# Patient Record
Sex: MALE | Race: WHITE | NOT HISPANIC OR LATINO | Employment: FULL TIME | RURAL
[De-identification: names, ages, dates, MRNs, and addresses within clinical notes are randomized per-mention and may not be internally consistent; named-entity substitution may affect disease eponyms.]

---

## 2018-09-29 ENCOUNTER — CONVERSION ENCOUNTER (OUTPATIENT)
Dept: FAMILY MEDICINE CLINIC | Facility: CLINIC | Age: 58
End: 2018-09-29

## 2018-09-30 LAB
ALBUMIN SERPL-MCNC: 4.3 G/DL (ref 3.6–5.1)
ALP SERPL-CCNC: 61 U/L (ref 40–115)
ALT SERPL-CCNC: 22 U/L (ref 9–46)
AST SERPL-CCNC: 20 U/L (ref 10–35)
BASOPHILS # BLD AUTO: 78 CELLS/UL (ref 0–200)
BASOPHILS NFR BLD AUTO: 1.7 %
BILIRUB SERPL-MCNC: 0.7 MG/DL (ref 0.2–1.2)
BUN SERPL-MCNC: 17 MG/DL (ref 7–25)
BUN/CREAT SERPL: NORMAL (CALC) (ref 6–22)
CALCIUM SERPL-MCNC: 9.3 MG/DL (ref 8.6–10.3)
CHLORIDE SERPL-SCNC: 103 MMOL/L (ref 98–110)
CHOLEST SERPL-MCNC: 159 MG/DL
CHOLEST/HDLC SERPL: 3.7 (CALC)
CONV CO2: 28 MMOL/L (ref 20–32)
CONV TOTAL PROTEIN: 7.1 G/DL (ref 6.1–8.1)
CREAT UR-MCNC: 0.95 MG/DL (ref 0.7–1.33)
EOSINOPHIL # BLD AUTO: 331 CELLS/UL (ref 15–500)
EOSINOPHIL # BLD AUTO: 7.2 %
ERYTHROCYTE [DISTWIDTH] IN BLOOD BY AUTOMATED COUNT: 12.8 % (ref 11–15)
GLOBULIN UR ELPH-MCNC: 2.8 MG/DL (ref 1.9–3.7)
GLUCOSE UR QL: 96 MG/DL (ref 65–99)
HCT VFR BLD AUTO: 46.6 % (ref 38.5–50)
HDLC SERPL-MCNC: 43 MG/DL
HGB BLD-MCNC: 15.8 G/DL (ref 13.2–17.1)
INSULIN SERPL-ACNC: 1.5 (CALC) (ref 1–2.5)
LDLC SERPL CALC-MCNC: 97 MG/DL
LYMPHOCYTES # BLD AUTO: 920 CELLS/UL (ref 850–3900)
LYMPHOCYTES NFR BLD AUTO: 20 %
MCH RBC QN AUTO: 29.4 PG (ref 27–33)
MCHC RBC AUTO-ENTMCNC: 33.9 G/DL (ref 32–36)
MCV RBC AUTO: 86.6 FL (ref 80–100)
MONOCYTES # BLD AUTO: 400 CELLS/UL (ref 200–950)
MONOCYTES NFR BLD AUTO: 8.7 %
NEUTROPHILS # BLD AUTO: 2870 CELLS/UL (ref 1500–7800)
NEUTROPHILS NFR BLD AUTO: 62.4 %
NONHDLC SERPL-MCNC: 116 MG/DL
PLATELET # BLD AUTO: 244 10*3/UL (ref 140–400)
PMV BLD AUTO: 9.8 FL (ref 7.5–12.5)
POTASSIUM SERPL-SCNC: 4.4 MMOL/L (ref 3.5–5.3)
PSA SERPL-MCNC: 2.7 NG/ML
RBC # BLD AUTO: 5.38 MILLION/UL (ref 4.2–5.8)
SODIUM SERPL-SCNC: 138 MMOL/L (ref 135–146)
TRIGL SERPL-MCNC: 93 MG/DL
TSH SERPL-ACNC: 1.1 MIU/L (ref 0.4–4.5)
WBC # BLD AUTO: 4.6 10*3/UL (ref 3.8–10.8)

## 2019-09-10 RX ORDER — SIMVASTATIN 20 MG
TABLET ORAL
Qty: 90 TABLET | Refills: 3 | OUTPATIENT
Start: 2019-09-10

## 2019-09-11 DIAGNOSIS — I10 BENIGN HYPERTENSION: Primary | ICD-10-CM

## 2019-09-11 DIAGNOSIS — E78.2 MIXED HYPERLIPIDEMIA: ICD-10-CM

## 2019-09-11 NOTE — TELEPHONE ENCOUNTER
Pt is requesting a refill of Simvastatin and Lisinopril to last until appt on 9/16/19 sent to [facility] [place]

## 2019-09-12 RX ORDER — ATENOLOL 50 MG/1
50 TABLET ORAL DAILY
Qty: 30 TABLET | Refills: 0 | Status: SHIPPED | OUTPATIENT
Start: 2019-09-12 | End: 2019-09-16 | Stop reason: SDUPTHER

## 2019-09-12 RX ORDER — SIMVASTATIN 20 MG
20 TABLET ORAL DAILY
Qty: 30 TABLET | Refills: 0 | Status: SHIPPED | OUTPATIENT
Start: 2019-09-12 | End: 2019-09-16 | Stop reason: SDUPTHER

## 2019-09-13 PROBLEM — R63.6 UNDERWEIGHT: Status: ACTIVE | Noted: 2019-09-13

## 2019-09-16 ENCOUNTER — OFFICE VISIT (OUTPATIENT)
Dept: FAMILY MEDICINE CLINIC | Facility: CLINIC | Age: 59
End: 2019-09-16

## 2019-09-16 VITALS
DIASTOLIC BLOOD PRESSURE: 82 MMHG | HEART RATE: 83 BPM | HEIGHT: 74 IN | OXYGEN SATURATION: 99 % | SYSTOLIC BLOOD PRESSURE: 138 MMHG | RESPIRATION RATE: 18 BRPM | BODY MASS INDEX: 22.07 KG/M2 | TEMPERATURE: 98 F | WEIGHT: 172 LBS

## 2019-09-16 DIAGNOSIS — Z00.00 PHYSICAL EXAM: Primary | ICD-10-CM

## 2019-09-16 DIAGNOSIS — Z12.11 COLON CANCER SCREENING: ICD-10-CM

## 2019-09-16 DIAGNOSIS — I10 BENIGN HYPERTENSION: ICD-10-CM

## 2019-09-16 DIAGNOSIS — E78.2 MIXED HYPERLIPIDEMIA: ICD-10-CM

## 2019-09-16 LAB
BILIRUB BLD-MCNC: NEGATIVE MG/DL
CLARITY, POC: CLEAR
COLOR UR: YELLOW
GLUCOSE UR STRIP-MCNC: NEGATIVE MG/DL
KETONES UR QL: NEGATIVE
LEUKOCYTE EST, POC: NEGATIVE
NITRITE UR-MCNC: NEGATIVE MG/ML
PH UR: 5 [PH] (ref 5–8)
PROT UR STRIP-MCNC: NEGATIVE MG/DL
RBC # UR STRIP: NEGATIVE /UL
SP GR UR: 1.01 (ref 1–1.03)
UROBILINOGEN UR QL: NORMAL

## 2019-09-16 PROCEDURE — 81003 URINALYSIS AUTO W/O SCOPE: CPT | Performed by: FAMILY MEDICINE

## 2019-09-16 PROCEDURE — 99396 PREV VISIT EST AGE 40-64: CPT | Performed by: FAMILY MEDICINE

## 2019-09-16 RX ORDER — ATENOLOL 50 MG/1
50 TABLET ORAL DAILY
Qty: 30 TABLET | Refills: 12 | Status: SHIPPED | OUTPATIENT
Start: 2019-09-16 | End: 2020-09-22 | Stop reason: SDUPTHER

## 2019-09-16 RX ORDER — NAPROXEN SODIUM 220 MG
220 TABLET ORAL 2 TIMES DAILY PRN
COMMUNITY
End: 2022-10-06

## 2019-09-16 RX ORDER — SIMVASTATIN 20 MG
20 TABLET ORAL DAILY
Qty: 30 TABLET | Refills: 12 | Status: SHIPPED | OUTPATIENT
Start: 2019-09-16 | End: 2020-09-22 | Stop reason: SDUPTHER

## 2019-09-16 NOTE — PROGRESS NOTES
Subjective   Beto Jo is a 58 y.o. male.     The patient is here: for coordination of medical care and annual wellness examination.  Patient's last comprehensive physical was on 9/25/18.  Patient's previous physician was Champ Choudhary MD.  Patient has: moderate activity with work/home activities, exercises 2 - 3 times per week, good appetite, feels well with no complaints, good energy level and is sleeping well  Patient's last tetanus shot was unknown. Patient's last colonoscopy was: Declined, has colaguard kit at home.           The following portions of the patient's history were reviewed and updated as appropriate: allergies, current medications, past family history, past medical history, past social history, past surgical history and problem list.    Patient Active Problem List   Diagnosis   • Benign hypertension   • Mixed hyperlipidemia   • Underweight   • Colon cancer screening       Current Outpatient Medications on File Prior to Visit   Medication Sig Dispense Refill   • ADULT ASPIRIN LOW STRENGTH PO Take 1 tablet by mouth Daily.     • hydrocortisone (ANUSOL-HC) 2.5 % rectal cream Insert 1 application into the rectum.     • MULTIPLE VITAMIN PO Take  by mouth Daily.     • naproxen sodium (ALEVE) 220 MG tablet Take 220 mg by mouth 2 (Two) Times a Day As Needed.     • [DISCONTINUED] atenolol (TENORMIN) 50 MG tablet Take 1 tablet by mouth Daily. 30 tablet 0   • [DISCONTINUED] simvastatin (ZOCOR) 20 MG tablet Take 1 tablet by mouth Daily. 30 tablet 0     No current facility-administered medications on file prior to visit.        No Known Allergies    Review of Systems   Constitutional: Negative for activity change, appetite change, fatigue and fever.   HENT: Negative for ear pain, swollen glands and voice change.    Eyes: Negative for visual disturbance.   Respiratory: Negative for shortness of breath and wheezing.    Cardiovascular: Negative for chest pain and leg swelling.   Gastrointestinal: Negative for  abdominal pain, blood in stool, constipation, diarrhea, nausea and vomiting.   Endocrine: Negative for polydipsia and polyuria.   Genitourinary: Negative for dysuria, frequency and hematuria.   Musculoskeletal: Negative for joint swelling, neck pain and neck stiffness.   Skin: Negative for rash and bruise.   Neurological: Negative for weakness, numbness and headache.   Psychiatric/Behavioral: Negative for suicidal ideas and depressed mood.       Objective   Physical Exam   Constitutional: He is oriented to person, place, and time. He appears well-developed and well-nourished.   Eyes: Conjunctivae and EOM are normal. Pupils are equal, round, and reactive to light.   Neck: Normal range of motion. Neck supple.   Cardiovascular: Normal rate, regular rhythm and normal heart sounds.   Pulmonary/Chest: Effort normal and breath sounds normal.   Abdominal: Soft. Bowel sounds are normal.   Musculoskeletal: Normal range of motion.   Neurological: He is alert and oriented to person, place, and time.   Skin: Skin is warm and dry.   Psychiatric: He has a normal mood and affect. His behavior is normal. Judgment and thought content normal.         Assessment/Plan .  Problem List Items Addressed This Visit     Benign hypertension    Overview     continue current management         Relevant Medications    atenolol (TENORMIN) 50 MG tablet    Other Relevant Orders    POC Urinalysis Dipstick, Automated (Completed)    Lipid Panel With / Chol / HDL Ratio    Mixed hyperlipidemia    Overview     pt needs 1 month refill untill follow up         Relevant Medications    simvastatin (ZOCOR) 20 MG tablet    Other Relevant Orders    Comprehensive Metabolic Panel    CBC Auto Differential    TSH    Colon cancer screening    Current Assessment & Plan     recommend cologuard or colonoscopy.           Other Visit Diagnoses     Physical exam    -  Primary    Relevant Orders    POC Urinalysis Dipstick, Automated (Completed)    Comprehensive Metabolic  Panel    CBC Auto Differential    Lipid Panel With / Chol / HDL Ratio    PSA Screen    TSH        .Discussed anticipatory guidance, diet, exercise, and weight loss.  Discussed safety and routine screening examinations.  Discussed self-examinations.  Medication and medication adverse effects discussed.  Drug education given and explained to patient. Patient verbalized understanding.  Follow-up for routine health maintenance as directed

## 2019-09-22 LAB
ALBUMIN SERPL-MCNC: 4.2 G/DL (ref 3.5–5.5)
ALBUMIN/GLOB SERPL: 1.6 {RATIO} (ref 1.2–2.2)
ALP SERPL-CCNC: 56 IU/L (ref 39–117)
ALT SERPL-CCNC: 18 IU/L (ref 0–44)
AST SERPL-CCNC: 16 IU/L (ref 0–40)
BASOPHILS # BLD AUTO: 0.1 X10E3/UL (ref 0–0.2)
BASOPHILS NFR BLD AUTO: 2 %
BILIRUB SERPL-MCNC: 0.4 MG/DL (ref 0–1.2)
BUN SERPL-MCNC: 17 MG/DL (ref 6–24)
BUN/CREAT SERPL: 15 (ref 9–20)
CALCIUM SERPL-MCNC: 9.3 MG/DL (ref 8.7–10.2)
CHLORIDE SERPL-SCNC: 106 MMOL/L (ref 96–106)
CHOLEST SERPL-MCNC: 146 MG/DL (ref 100–199)
CHOLEST/HDLC SERPL: 3.7 RATIO (ref 0–5)
CO2 SERPL-SCNC: 23 MMOL/L (ref 20–29)
CREAT SERPL-MCNC: 1.1 MG/DL (ref 0.76–1.27)
EOSINOPHIL # BLD AUTO: 0.3 X10E3/UL (ref 0–0.4)
EOSINOPHIL NFR BLD AUTO: 8 %
ERYTHROCYTE [DISTWIDTH] IN BLOOD BY AUTOMATED COUNT: 13.5 % (ref 12.3–15.4)
GLOBULIN SER CALC-MCNC: 2.7 G/DL (ref 1.5–4.5)
GLUCOSE SERPL-MCNC: 94 MG/DL (ref 65–99)
HCT VFR BLD AUTO: 46.9 % (ref 37.5–51)
HDLC SERPL-MCNC: 39 MG/DL
HGB BLD-MCNC: 15.4 G/DL (ref 13–17.7)
IMM GRANULOCYTES # BLD AUTO: 0 X10E3/UL (ref 0–0.1)
IMM GRANULOCYTES NFR BLD AUTO: 0 %
LDLC SERPL CALC-MCNC: 88 MG/DL (ref 0–99)
LYMPHOCYTES # BLD AUTO: 1.1 X10E3/UL (ref 0.7–3.1)
LYMPHOCYTES NFR BLD AUTO: 24 %
MCH RBC QN AUTO: 29.7 PG (ref 26.6–33)
MCHC RBC AUTO-ENTMCNC: 32.8 G/DL (ref 31.5–35.7)
MCV RBC AUTO: 90 FL (ref 79–97)
MONOCYTES # BLD AUTO: 0.5 X10E3/UL (ref 0.1–0.9)
MONOCYTES NFR BLD AUTO: 12 %
NEUTROPHILS # BLD AUTO: 2.4 X10E3/UL (ref 1.4–7)
NEUTROPHILS NFR BLD AUTO: 54 %
PLATELET # BLD AUTO: 212 X10E3/UL (ref 150–450)
POTASSIUM SERPL-SCNC: 4.4 MMOL/L (ref 3.5–5.2)
PROT SERPL-MCNC: 6.9 G/DL (ref 6–8.5)
PSA SERPL-MCNC: 3.2 NG/ML (ref 0–4)
RBC # BLD AUTO: 5.19 X10E6/UL (ref 4.14–5.8)
SODIUM SERPL-SCNC: 144 MMOL/L (ref 134–144)
TRIGL SERPL-MCNC: 96 MG/DL (ref 0–149)
TSH SERPL DL<=0.005 MIU/L-ACNC: 1.42 UIU/ML (ref 0.45–4.5)
VLDLC SERPL CALC-MCNC: 19 MG/DL (ref 5–40)
WBC # BLD AUTO: 4.4 X10E3/UL (ref 3.4–10.8)

## 2019-09-23 ENCOUNTER — TELEPHONE (OUTPATIENT)
Dept: FAMILY MEDICINE CLINIC | Facility: CLINIC | Age: 59
End: 2019-09-23

## 2019-09-23 DIAGNOSIS — I10 BENIGN HYPERTENSION: Primary | ICD-10-CM

## 2019-09-23 RX ORDER — HYDROCHLOROTHIAZIDE 12.5 MG/1
12.5 TABLET ORAL DAILY
Qty: 30 TABLET | Refills: 1 | Status: SHIPPED | OUTPATIENT
Start: 2019-09-23 | End: 2019-11-01

## 2019-09-23 NOTE — TELEPHONE ENCOUNTER
----- Message from Champ Choudhary MD sent at 9/22/2019  8:38 PM EDT -----  Please patient that labs were normal.

## 2019-10-21 ENCOUNTER — HOSPITAL ENCOUNTER (EMERGENCY)
Facility: HOSPITAL | Age: 59
Discharge: HOME | End: 2019-10-21
Attending: EMERGENCY MEDICINE | Admitting: EMERGENCY MEDICINE

## 2019-10-21 ENCOUNTER — APPOINTMENT (OUTPATIENT)
Dept: CT IMAGING | Facility: HOSPITAL | Age: 59
End: 2019-10-21

## 2019-10-21 VITALS
SYSTOLIC BLOOD PRESSURE: 134 MMHG | OXYGEN SATURATION: 99 % | WEIGHT: 164 LBS | DIASTOLIC BLOOD PRESSURE: 88 MMHG | HEIGHT: 74 IN | TEMPERATURE: 97.4 F | BODY MASS INDEX: 21.05 KG/M2 | HEART RATE: 58 BPM | RESPIRATION RATE: 12 BRPM

## 2019-10-21 DIAGNOSIS — G47.9 FATIGUE DUE TO SLEEP PATTERN DISTURBANCE: ICD-10-CM

## 2019-10-21 DIAGNOSIS — I10 ESSENTIAL HYPERTENSION: ICD-10-CM

## 2019-10-21 DIAGNOSIS — R53.83 FATIGUE DUE TO SLEEP PATTERN DISTURBANCE: ICD-10-CM

## 2019-10-21 DIAGNOSIS — R20.2 PARESTHESIA OF RIGHT UPPER EXTREMITY: Primary | ICD-10-CM

## 2019-10-21 LAB
ALBUMIN SERPL-MCNC: 4.4 G/DL (ref 3.5–5.2)
ALBUMIN/GLOB SERPL: 1.5 G/DL
ALP SERPL-CCNC: 55 U/L (ref 39–117)
ALT SERPL W P-5'-P-CCNC: 16 U/L (ref 1–41)
ANION GAP SERPL CALCULATED.3IONS-SCNC: 11 MMOL/L (ref 5–15)
AST SERPL-CCNC: 20 U/L (ref 1–40)
BASOPHILS # BLD AUTO: 0.1 10*3/MM3 (ref 0–0.2)
BASOPHILS NFR BLD AUTO: 1 % (ref 0–1.5)
BILIRUB SERPL-MCNC: 0.4 MG/DL (ref 0.2–1.2)
BUN BLD-MCNC: 19 MG/DL (ref 6–20)
BUN/CREAT SERPL: 17.8 (ref 7–25)
CALCIUM SPEC-SCNC: 9.2 MG/DL (ref 8.6–10.5)
CHLORIDE SERPL-SCNC: 101 MMOL/L (ref 98–107)
CO2 SERPL-SCNC: 26 MMOL/L (ref 22–29)
CREAT BLD-MCNC: 1.07 MG/DL (ref 0.76–1.27)
DEPRECATED RDW RBC AUTO: 40.3 FL (ref 37–54)
EOSINOPHIL # BLD AUTO: 0.3 10*3/MM3 (ref 0–0.4)
EOSINOPHIL NFR BLD AUTO: 5.5 % (ref 0.3–6.2)
ERYTHROCYTE [DISTWIDTH] IN BLOOD BY AUTOMATED COUNT: 13.1 % (ref 12.3–15.4)
GFR SERPL CREATININE-BSD FRML MDRD: 71 ML/MIN/1.73
GLOBULIN UR ELPH-MCNC: 3 GM/DL
GLUCOSE BLD-MCNC: 105 MG/DL (ref 65–99)
HCT VFR BLD AUTO: 47.6 % (ref 37.5–51)
HGB BLD-MCNC: 16.2 G/DL (ref 13–17.7)
HOLD SPECIMEN: NORMAL
LYMPHOCYTES # BLD AUTO: 1.2 10*3/MM3 (ref 0.7–3.1)
LYMPHOCYTES NFR BLD AUTO: 22.8 % (ref 19.6–45.3)
MCH RBC QN AUTO: 30 PG (ref 26.6–33)
MCHC RBC AUTO-ENTMCNC: 34.1 G/DL (ref 31.5–35.7)
MCV RBC AUTO: 88 FL (ref 79–97)
MONOCYTES # BLD AUTO: 0.6 10*3/MM3 (ref 0.1–0.9)
MONOCYTES NFR BLD AUTO: 12.3 % (ref 5–12)
NEUTROPHILS # BLD AUTO: 3.1 10*3/MM3 (ref 1.7–7)
NEUTROPHILS NFR BLD AUTO: 58.4 % (ref 42.7–76)
NRBC BLD AUTO-RTO: 0.1 /100 WBC (ref 0–0.2)
PLATELET # BLD AUTO: 201 10*3/MM3 (ref 140–450)
PMV BLD AUTO: 7.5 FL (ref 6–12)
POTASSIUM BLD-SCNC: 3.9 MMOL/L (ref 3.5–5.2)
PROT SERPL-MCNC: 7.4 G/DL (ref 6–8.5)
RBC # BLD AUTO: 5.41 10*6/MM3 (ref 4.14–5.8)
SODIUM BLD-SCNC: 138 MMOL/L (ref 136–145)
TROPONIN T SERPL-MCNC: <0.01 NG/ML (ref 0–0.03)
WBC NRBC COR # BLD: 5.2 10*3/MM3 (ref 3.4–10.8)

## 2019-10-21 PROCEDURE — 99283 EMERGENCY DEPT VISIT LOW MDM: CPT

## 2019-10-21 PROCEDURE — 80053 COMPREHEN METABOLIC PANEL: CPT | Performed by: EMERGENCY MEDICINE

## 2019-10-21 PROCEDURE — 70450 CT HEAD/BRAIN W/O DYE: CPT

## 2019-10-21 PROCEDURE — 84484 ASSAY OF TROPONIN QUANT: CPT | Performed by: EMERGENCY MEDICINE

## 2019-10-21 PROCEDURE — 93005 ELECTROCARDIOGRAM TRACING: CPT | Performed by: EMERGENCY MEDICINE

## 2019-10-21 PROCEDURE — 85025 COMPLETE CBC W/AUTO DIFF WBC: CPT | Performed by: EMERGENCY MEDICINE

## 2019-10-21 PROCEDURE — 93005 ELECTROCARDIOGRAM TRACING: CPT

## 2019-10-21 NOTE — DISCHARGE INSTRUCTIONS
Rest today.  He will get better rest at home  Take daily baby aspirin [82 mg]  Follow-up with your primary care provider.  You may need nerve conduction studies for your right ulnar nerve if symptoms persist

## 2019-10-21 NOTE — ED NOTES
Called laboratory to release metabolic panel. Laboratory stated that they cannot release the labs until the calcium is repeated.     Keri Escalera  10/21/19 0741

## 2019-10-21 NOTE — ED NOTES
Patient came down to ER from visiting with his wife due to waking up with tingling and numbness in his right hand/forarm area.  He also reports waking up feeling like he was blacked out because his memory was foggy.  He reports history of hypertension and started on new water pill recently.      Chapin Linares RN  10/21/19 0588

## 2019-10-21 NOTE — ED PROVIDER NOTES
Subjective   59-year-old male states he awoke with a funny sensation in his right arm.  He is right-hand dominant.  She has a history of hypertension he has had no history of previous seizure or peripheral nerve problems.  The patient reports she has been spending t nights this week at the hospital with his wife who is sick.  He denies current headache.  He denies neck pain or stiffness.  He reports no diaphoresis or palpitations.  He reports that the tingling was worse in the fourth and fifth digits.  He reports that he has had no focal neurologic defects or lateralizing neurologic signs            Review of Systems   Constitutional: Positive for fatigue. Negative for chills, diaphoresis, fever and unexpected weight change.   HENT: Negative for sore throat.    Eyes: Negative for visual disturbance.   Respiratory: Negative for wheezing and stridor.    Cardiovascular: Negative for palpitations and leg swelling.   Gastrointestinal: Negative for abdominal pain.   Genitourinary: Negative for enuresis and urgency.   Neurological: Positive for dizziness and light-headedness. Negative for tremors, seizures, facial asymmetry, weakness, numbness and headaches.   Hematological: Does not bruise/bleed easily.       Past Medical History:   Diagnosis Date   • Acute bacterial conjunctivitis of right eye    • Annual physical exam     Impression: Discussed anticipatory guidance, diet, exercise, and weight loss. Discussed safety, seatbelts, and routine screening examinations. Discussed self-examinations.   • Benign hypertension     Impression: continue current management   • External hemorrhoids     Impression: Findings discussed. All questions answered. Medication and medication adverse effects discussed. Follow-up in 2 weeks if not better. Follow-up sooner for worsening symptoms or for any concerns.   • History of cardiac catheterization     Impression: nonobstructive CAD 4/07 40-50% obstr LAD   • Irritant contact dermatitis due to  other agents     Impression: taking topical steroids. Improving, Continue current mgmt.   • Kidney stone    • Mixed hyperlipidemia     Impression: pt needs 1 month refill untill follow up   • Periorbital cellulitis of right eye     Impression: He was started on Augmentin 875 BID x 10 days and Maxitrol. He was encouraged to RTC if his symptoms did not improve.   • Screening for malignant neoplasm of the rectum     Impression: Recommend screening colonoscopy. Pt will check with insurance and let me know.   • Underweight        No Known Allergies    Past Surgical History:   Procedure Laterality Date   • CARDIAC CATHETERIZATION  04/2007    40% LAD otherwise normal       Family History   Problem Relation Age of Onset   • Heart disease Mother         cardiovascular, ischemic   • Diabetes Mother         mellitus   • Stroke Other         grandfather       Social History     Socioeconomic History   • Marital status:      Spouse name: Not on file   • Number of children: Not on file   • Years of education: Not on file   • Highest education level: Not on file   Tobacco Use   • Smoking status: Never Smoker   • Smokeless tobacco: Never Used   Substance and Sexual Activity   • Alcohol use: No     Frequency: Never   • Drug use: No           Objective   Physical Exam   Constitutional: He is oriented to person, place, and time.   Neurological: He is alert and oriented to person, place, and time. He displays normal reflexes. No cranial nerve deficit or sensory deficit. He exhibits normal muscle tone. Coordination normal.     Alert Theodore Coma Scale 15   HEENT: Pupils equal and reactive to light. Conjunctivae are not injected. normal tympanic membranes. Oropharynx and nares are normal.   Neck: Supple. Midline trachea. No JVD. No goiter.   Chest: Clear and equal breath sounds bilaterally regular rate and rhythm without murmur or rub.  Nontender chest wall   Abdomen: Positive bowel sounds nontender nondistended. No rebound or  peritoneal signs. No CVA tenderness.   Extremities no clubbing cyanosis or edema motor sensory exam is normal the full range of motion is intact there is some concern for ulnar neuropathy.  The patient is right-hand dominant   skin: Warm and dry, no rashes or petechia.   Lymphatic: No regional lymphadenopathy. No calf pain, swelling or Betty's sign    Procedures           ED Course          Labs Reviewed   CBC WITH AUTO DIFFERENTIAL - Abnormal; Notable for the following components:       Result Value    Monocyte % 12.3 (*)     All other components within normal limits   TROPONIN (IN-HOUSE) - Normal    Narrative:     Troponin T Reference Range:  <= 0.03 ng/mL-   Negative for AMI  >0.03 ng/mL-     Abnormal for myocardial necrosis.  Clinicians would have to utilize clinical acumen, EKG, Troponin and serial changes to determine if it is an Acute Myocardial Infarction or myocardial injury due to an underlying chronic condition.    COMPREHENSIVE METABOLIC PANEL   CBC AND DIFFERENTIAL    Narrative:     The following orders were created for panel order CBC & Differential.  Procedure                               Abnormality         Status                     ---------                               -----------         ------                     CBC Auto Differential[[id number]]        Abnormal            Final result                 Please view results for these tests on the individual orders.   EXTRA TUBES    Narrative:     The following orders were created for panel order Extra Tubes.  Procedure                               Abnormality         Status                     ---------                               -----------         ------                     Gold Top - SST[[id number]]                                   Final result                 Please view results for these tests on the individual orders.   GOLD TOP - SST     Medications - No data to display  Ct Head Without Contrast    Result Date: 10/21/2019  1. No acute  intracranial abnormality is identified. 2. Generalized brain volume loss and mild small vessel ischemic changes. Jordy Akins M.D. Neuroradiologist Electronically signed by:  Jordy Akins M.D.  10/21/2019 5:07 AM            MDM  Number of Diagnoses or Management Options     Amount and/or Complexity of Data Reviewed  Clinical lab tests: reviewed  Tests in the radiology section of CPT®: reviewed  Tests in the medicine section of CPT®: reviewed  Review and summarize past medical records: yes  Independent visualization of images, tracings, or specimens: yes    Risk of Complications, Morbidity, and/or Mortality  Presenting problems: high  Diagnostic procedures: high  Management options: high  General comments: The patient was encouraged to take a baby aspirin daily.  The patient was encouraged also to go home and rest today.  Advised to continue his medications as directed follow-up with his primary care provider.  The patient may need evaluation for peripheral ulnar neuropathy symptomatology persists.  He is agreeable with this plan of treatment    Patient Progress  Patient progress: improved      Final diagnoses:   Paresthesia of right upper extremity   Essential hypertension   Fatigue due to sleep pattern disturbance              Jose M Gonzalez MD  10/21/19 0728

## 2019-11-01 ENCOUNTER — TELEPHONE (OUTPATIENT)
Dept: FAMILY MEDICINE CLINIC | Facility: CLINIC | Age: 59
End: 2019-11-01

## 2019-11-01 DIAGNOSIS — I10 BENIGN HYPERTENSION: ICD-10-CM

## 2019-11-01 RX ORDER — HYDROCHLOROTHIAZIDE 12.5 MG/1
25 TABLET ORAL DAILY
Qty: 30 TABLET | Refills: 1 | Status: SHIPPED | OUTPATIENT
Start: 2019-11-01 | End: 2019-11-05 | Stop reason: DRUGHIGH

## 2019-11-01 NOTE — TELEPHONE ENCOUNTER
Pt states blood pressure is still elevated after beginning medication.  10/31 7am 149/97. 67 (before meds)  10/31 7:30am 139/87. 65  10/31 9:30pm 160/93. 63  11/1 7:30am 152/96. 67 (before meds)  Please advise.

## 2019-11-05 ENCOUNTER — TELEPHONE (OUTPATIENT)
Dept: FAMILY MEDICINE CLINIC | Facility: CLINIC | Age: 59
End: 2019-11-05

## 2019-11-05 DIAGNOSIS — I10 BENIGN HYPERTENSION: ICD-10-CM

## 2019-11-05 RX ORDER — HYDROCHLOROTHIAZIDE 25 MG/1
25 TABLET ORAL DAILY
Qty: 30 TABLET | Refills: 5 | Status: SHIPPED | OUTPATIENT
Start: 2019-11-05 | End: 2020-09-22 | Stop reason: SDUPTHER

## 2019-11-05 RX ORDER — LISINOPRIL 10 MG/1
10 TABLET ORAL DAILY
Qty: 30 TABLET | Refills: 5 | Status: SHIPPED | OUTPATIENT
Start: 2019-11-05 | End: 2020-03-17 | Stop reason: SDUPTHER

## 2019-11-05 NOTE — TELEPHONE ENCOUNTER
Please verify he is taking atenolol 50 mg qd and hctz 12.5 mg qd. If so, increase hctz to 25 mg qd

## 2019-11-05 NOTE — TELEPHONE ENCOUNTER
Pt is already taking HCTZ 25mg.  Also on Atenolol 50mg qd.  Notified pt add Lisinopril 10mg qd to what he is currently taking.  Rx sent to [facility].  He will f/u in 2 weeks unless med is not helping then he will come in sooner.

## 2019-11-05 NOTE — TELEPHONE ENCOUNTER
Patient called said blood pressure is not coming down and he is concerned    Friday 148/87   156/91       Saturday  148/88            Sunday    156/96      151/84  Monday    124/86       Tuesday  134/94    Patient is concerned-call to advise what he needs to do

## 2019-11-18 ENCOUNTER — OFFICE VISIT (OUTPATIENT)
Dept: FAMILY MEDICINE CLINIC | Facility: CLINIC | Age: 59
End: 2019-11-18

## 2019-11-18 VITALS
SYSTOLIC BLOOD PRESSURE: 122 MMHG | DIASTOLIC BLOOD PRESSURE: 78 MMHG | TEMPERATURE: 97.7 F | BODY MASS INDEX: 21.92 KG/M2 | RESPIRATION RATE: 18 BRPM | HEART RATE: 73 BPM | HEIGHT: 74 IN | OXYGEN SATURATION: 98 % | WEIGHT: 170.8 LBS

## 2019-11-18 DIAGNOSIS — I10 BENIGN HYPERTENSION: Primary | ICD-10-CM

## 2019-11-18 PROCEDURE — 99213 OFFICE O/P EST LOW 20 MIN: CPT | Performed by: FAMILY MEDICINE

## 2019-11-18 NOTE — PROGRESS NOTES
Subjective   Beto Jo is a 59 y.o. male.     [facility], has brought BP readings from home.       Hypertension   The current episode started more than 1 month ago. The problem has been gradually improving since onset. The problem is controlled. Pertinent negatives include no chest pain, neck pain or shortness of breath. Current antihypertension treatment includes ACE inhibitors, diuretics and beta blockers. The current treatment provides significant improvement. There are no compliance problems.         The following portions of the patient's history were reviewed and updated as appropriate: allergies, current medications, past family history, past medical history, past social history, past surgical history and problem list.    Patient Active Problem List   Diagnosis   • Benign hypertension   • Mixed hyperlipidemia   • Underweight   • Colon cancer screening       Current Outpatient Medications on File Prior to Visit   Medication Sig Dispense Refill   • ADULT ASPIRIN LOW STRENGTH PO Take 1 tablet by mouth Daily.     • atenolol (TENORMIN) 50 MG tablet Take 1 tablet by mouth Daily. 30 tablet 12   • hydroCHLOROthiazide (HYDRODIURIL) 25 MG tablet Take 1 tablet by mouth Daily. 30 tablet 5   • hydrocortisone (ANUSOL-HC) 2.5 % rectal cream Insert 1 application into the rectum.     • lisinopril (PRINIVIL,ZESTRIL) 10 MG tablet Take 1 tablet by mouth Daily. 30 tablet 5   • MULTIPLE VITAMIN PO Take  by mouth Daily.     • naproxen sodium (ALEVE) 220 MG tablet Take 220 mg by mouth 2 (Two) Times a Day As Needed.     • simvastatin (ZOCOR) 20 MG tablet Take 1 tablet by mouth Daily. 30 tablet 12     No current facility-administered medications on file prior to visit.        No Known Allergies    Review of Systems   Constitutional: Negative for activity change, appetite change, fatigue and fever.   HENT: Negative for ear pain, swollen glands and voice change.    Eyes: Negative for visual disturbance.    Respiratory: Negative for shortness of breath and wheezing.    Cardiovascular: Negative for chest pain and leg swelling.   Gastrointestinal: Negative for abdominal pain, blood in stool, constipation, diarrhea, nausea and vomiting.   Endocrine: Negative for polydipsia and polyuria.   Genitourinary: Negative for dysuria, frequency and hematuria.   Musculoskeletal: Negative for joint swelling, neck pain and neck stiffness.   Skin: Negative for rash and bruise.   Neurological: Negative for weakness, numbness and headache.   Psychiatric/Behavioral: Negative for suicidal ideas and depressed mood.       Objective   Vitals:    11/18/19 1633   BP: 122/78   Pulse: 73   Resp: 18   Temp: 97.7 °F (36.5 °C)   SpO2: 98%     Physical Exam   Constitutional: He is oriented to person, place, and time. He appears well-developed and well-nourished.   Eyes: Conjunctivae and EOM are normal. Pupils are equal, round, and reactive to light.   Neck: Normal range of motion. Neck supple.   Cardiovascular: Normal rate, regular rhythm and normal heart sounds.   Pulmonary/Chest: Effort normal and breath sounds normal.   Abdominal: Soft. Bowel sounds are normal.   Musculoskeletal: Normal range of motion.   Neurological: He is alert and oriented to person, place, and time.   Skin: Skin is warm and dry.   Psychiatric: He has a normal mood and affect. His behavior is normal. Judgment and thought content normal.         Assessment/Plan .  Problem List Items Addressed This Visit     Benign hypertension - Primary    Overview     continue current management         Current Assessment & Plan     Much better on atenolol, hctz, and lisinopril.             Pt interested in d/c HCTZ due to the urinary freq. Try weaning. Watch for recurrence of elevated BP. Follow-up in 4-6 weeks if not better.  Follow-up sooner for worsening symptoms or for any concerns.      Otherwise f/u in 6 months.

## 2020-03-17 ENCOUNTER — TELEPHONE (OUTPATIENT)
Dept: FAMILY MEDICINE CLINIC | Facility: CLINIC | Age: 60
End: 2020-03-17

## 2020-03-17 DIAGNOSIS — I10 BENIGN HYPERTENSION: Primary | ICD-10-CM

## 2020-03-17 RX ORDER — LISINOPRIL 10 MG/1
10 TABLET ORAL DAILY
Qty: 90 TABLET | Refills: 3 | Status: SHIPPED | OUTPATIENT
Start: 2020-03-17 | End: 2020-09-22 | Stop reason: SDUPTHER

## 2020-09-22 ENCOUNTER — OFFICE VISIT (OUTPATIENT)
Dept: FAMILY MEDICINE CLINIC | Facility: CLINIC | Age: 60
End: 2020-09-22

## 2020-09-22 ENCOUNTER — RESULTS ENCOUNTER (OUTPATIENT)
Dept: FAMILY MEDICINE CLINIC | Facility: CLINIC | Age: 60
End: 2020-09-22

## 2020-09-22 VITALS
TEMPERATURE: 98.2 F | SYSTOLIC BLOOD PRESSURE: 152 MMHG | WEIGHT: 169.8 LBS | BODY MASS INDEX: 21.79 KG/M2 | HEART RATE: 75 BPM | HEIGHT: 74 IN | RESPIRATION RATE: 18 BRPM | OXYGEN SATURATION: 97 % | DIASTOLIC BLOOD PRESSURE: 90 MMHG

## 2020-09-22 DIAGNOSIS — Z00.00 PHYSICAL EXAM: Primary | ICD-10-CM

## 2020-09-22 DIAGNOSIS — H61.91 SKIN LESION OF RIGHT EAR: ICD-10-CM

## 2020-09-22 DIAGNOSIS — Z12.5 ENCOUNTER FOR PROSTATE CANCER SCREENING: ICD-10-CM

## 2020-09-22 DIAGNOSIS — Z12.11 SCREEN FOR COLON CANCER: ICD-10-CM

## 2020-09-22 DIAGNOSIS — Z12.11 COLON CANCER SCREENING: ICD-10-CM

## 2020-09-22 DIAGNOSIS — E78.2 MIXED HYPERLIPIDEMIA: ICD-10-CM

## 2020-09-22 DIAGNOSIS — I10 BENIGN HYPERTENSION: ICD-10-CM

## 2020-09-22 LAB
BILIRUB BLD-MCNC: NEGATIVE MG/DL
CLARITY, POC: CLEAR
COLOR UR: YELLOW
GLUCOSE UR STRIP-MCNC: NEGATIVE MG/DL
KETONES UR QL: NEGATIVE
LEUKOCYTE EST, POC: NEGATIVE
NITRITE UR-MCNC: NEGATIVE MG/ML
PH UR: 5 [PH] (ref 5–8)
PROT UR STRIP-MCNC: NEGATIVE MG/DL
RBC # UR STRIP: ABNORMAL /UL
SP GR UR: 1.02 (ref 1–1.03)
UROBILINOGEN UR QL: NORMAL

## 2020-09-22 PROCEDURE — 99212 OFFICE O/P EST SF 10 MIN: CPT | Performed by: FAMILY MEDICINE

## 2020-09-22 PROCEDURE — 99396 PREV VISIT EST AGE 40-64: CPT | Performed by: FAMILY MEDICINE

## 2020-09-22 PROCEDURE — 81003 URINALYSIS AUTO W/O SCOPE: CPT | Performed by: FAMILY MEDICINE

## 2020-09-22 RX ORDER — SIMVASTATIN 20 MG
20 TABLET ORAL DAILY
Qty: 90 TABLET | Refills: 3 | Status: SHIPPED | OUTPATIENT
Start: 2020-09-22 | End: 2021-08-03 | Stop reason: SDUPTHER

## 2020-09-22 RX ORDER — ATENOLOL 50 MG/1
50 TABLET ORAL DAILY
Qty: 90 TABLET | Refills: 3 | Status: SHIPPED | OUTPATIENT
Start: 2020-09-22 | End: 2021-08-03 | Stop reason: SDUPTHER

## 2020-09-22 RX ORDER — HYDROCHLOROTHIAZIDE 25 MG/1
25 TABLET ORAL DAILY
Qty: 90 TABLET | Refills: 3 | Status: SHIPPED | OUTPATIENT
Start: 2020-09-22 | End: 2021-04-05

## 2020-09-22 RX ORDER — LISINOPRIL 10 MG/1
10 TABLET ORAL DAILY
Qty: 90 TABLET | Refills: 3 | Status: SHIPPED | OUTPATIENT
Start: 2020-09-22 | End: 2021-08-03 | Stop reason: SDUPTHER

## 2020-09-22 NOTE — PROGRESS NOTES
Subjective   Beto Jo is a 59 y.o. male.     The patient is here: for coordination of medical care and annual wellness examination.  Patient's last comprehensive physical was on 9/16/19.  Patient's previous physician was Champ Choudhary MD.  Patient has: vigorous activity with work/home activities, exercises 2 - 3 times per week, good appetite, feels well with minor complaints, good energy level and is sleeping well.  Patient's last tetanus shot was unknown. Patient's last colonoscopy was: Declines, interested in cologuard.      Hypertension  The current episode started more than 1 month ago. Pertinent negatives include no chest pain, neck pain or shortness of breath. Current antihypertension treatment includes ACE inhibitors, diuretics and beta blockers. There are no compliance problems.    Hyperlipidemia  The current episode started more than 1 year ago. Pertinent negatives include no chest pain or shortness of breath. Current antihyperlipidemic treatment includes statins.        The following portions of the patient's history were reviewed and updated as appropriate: allergies, current medications, past family history, past medical history, past social history, past surgical history and problem list.    Patient Active Problem List   Diagnosis   • Benign hypertension   • Mixed hyperlipidemia   • Underweight   • Colon cancer screening   • History of cardiac catheterization       Current Outpatient Medications on File Prior to Visit   Medication Sig Dispense Refill   • ADULT ASPIRIN LOW STRENGTH PO Take 1 tablet by mouth Daily.     • hydrocortisone (ANUSOL-HC) 2.5 % rectal cream Insert 1 application into the rectum.     • MULTIPLE VITAMIN PO Take  by mouth Daily.     • naproxen sodium (ALEVE) 220 MG tablet Take 220 mg by mouth 2 (Two) Times a Day As Needed.     • [DISCONTINUED] atenolol (TENORMIN) 50 MG tablet Take 1 tablet by mouth Daily. 30 tablet 12   • [DISCONTINUED] lisinopril (PRINIVIL,ZESTRIL) 10 MG tablet  Take 1 tablet by mouth Daily. 90 tablet 3   • [DISCONTINUED] simvastatin (ZOCOR) 20 MG tablet Take 1 tablet by mouth Daily. 30 tablet 12   • [DISCONTINUED] hydroCHLOROthiazide (HYDRODIURIL) 25 MG tablet Take 1 tablet by mouth Daily. 30 tablet 5     No current facility-administered medications on file prior to visit.      Current outpatient and discharge medications have been reconciled for the patient.  Reviewed by: Champ Choudhary MD      No Known Allergies    Review of Systems   Constitutional: Negative for activity change, appetite change, fatigue and fever.   HENT: Negative for ear pain, swollen glands and voice change.    Eyes: Negative for visual disturbance.   Respiratory: Negative for shortness of breath and wheezing.    Cardiovascular: Negative for chest pain and leg swelling.   Gastrointestinal: Negative for abdominal pain, blood in stool, constipation, diarrhea, nausea and vomiting.   Endocrine: Negative for polydipsia and polyuria.   Genitourinary: Negative for dysuria, frequency and hematuria.   Musculoskeletal: Negative for joint swelling, neck pain and neck stiffness.   Skin: Positive for skin lesions. Negative for rash and bruise.   Neurological: Negative for weakness, numbness and headache.   Psychiatric/Behavioral: Negative for suicidal ideas and depressed mood.     I have reviewed and confirmed the accuracy of the ROS as documented by the MA/LPN/RN Champ Choudhary MD      Objective   Vitals:    09/22/20 0752   BP: 152/90   Pulse: 75   Resp: 18   Temp: 98.2 °F (36.8 °C)   SpO2: 97%     Physical Exam  Constitutional:       Appearance: He is well-developed.   HENT:      Head: Normocephalic and atraumatic.      Right Ear: External ear normal.      Left Ear: External ear normal.      Nose: Nose normal.   Eyes:      Pupils: Pupils are equal, round, and reactive to light.   Neck:      Musculoskeletal: Normal range of motion and neck supple.   Cardiovascular:      Rate and Rhythm: Normal  rate and regular rhythm.      Heart sounds: Normal heart sounds.   Pulmonary:      Effort: Pulmonary effort is normal.      Breath sounds: Normal breath sounds.   Abdominal:      General: Bowel sounds are normal.      Palpations: Abdomen is soft.   Musculoskeletal: Normal range of motion.   Skin:     General: Skin is warm and dry.      Comments: Skin lesion right ear external canal ? granuloma   Neurological:      Mental Status: He is alert and oriented to person, place, and time.   Psychiatric:         Behavior: Behavior normal.         Thought Content: Thought content normal.         Judgment: Judgment normal.         I wore protective equipment throughout this patient encounter to include mask and eye protection. Hand hygiene was performed before donning protective equipment and after removal when leaving the room.    Assessment/Plan .  Beto was seen today for annual exam, hypertension and hyperlipidemia.    Diagnoses and all orders for this visit:    Physical exam  -     POC Urinalysis Dipstick, Automated  -     CBC Auto Differential  -     Comprehensive Metabolic Panel  -     Lipid Panel With / Chol / HDL Ratio  -     PSA Screen  -     TSH    Benign hypertension  Comments:  restart hctz, continue lisinopril and atenolol   Orders:  -     POC Urinalysis Dipstick, Automated  -     atenolol (TENORMIN) 50 MG tablet; Take 1 tablet by mouth Daily.  -     hydroCHLOROthiazide (HYDRODIURIL) 25 MG tablet; Take 1 tablet by mouth Daily.  -     lisinopril (PRINIVIL,ZESTRIL) 10 MG tablet; Take 1 tablet by mouth Daily.  -     Comprehensive Metabolic Panel    Mixed hyperlipidemia  Comments:  stable, continue current management   Orders:  -     simvastatin (ZOCOR) 20 MG tablet; Take 1 tablet by mouth Daily.  -     Comprehensive Metabolic Panel  -     Lipid Panel With / Chol / HDL Ratio    Encounter for prostate cancer screening  -     PSA Screen    Colon cancer screening    Screen for colon cancer  -     Cologuard - Stool, Per  Rectum; Future    Skin lesion of right ear  Comments:  possible granuloma from irritation from ear plugs. Consider referral for eval/bx if persistent.   Orders:  -     mupirocin (BACTROBAN) 2 % ointment; Apply  topically to the appropriate area as directed Daily.    Discussed anticipatory guidance, diet, exercise, and weight loss.  Discussed safety and routine screening examinations.  Discussed self-examinations.  Follow-up for routine health maintenance as directed  Medication and medication adverse effects discussed.  Drug education given and explained to patient. Patient verbalized understanding.  Follow-up in 4-6 weeks if not better.  Follow-up sooner for worsening symptoms or for any concerns.

## 2020-09-23 LAB
ALBUMIN SERPL-MCNC: 4.6 G/DL (ref 3.8–4.9)
ALBUMIN/GLOB SERPL: 1.8 {RATIO} (ref 1.2–2.2)
ALP SERPL-CCNC: 69 IU/L (ref 39–117)
ALT SERPL-CCNC: 19 IU/L (ref 0–44)
AST SERPL-CCNC: 21 IU/L (ref 0–40)
BASOPHILS # BLD AUTO: 0.1 X10E3/UL (ref 0–0.2)
BASOPHILS NFR BLD AUTO: 1 %
BILIRUB SERPL-MCNC: 0.4 MG/DL (ref 0–1.2)
BUN SERPL-MCNC: 14 MG/DL (ref 6–24)
BUN/CREAT SERPL: 14 (ref 9–20)
CALCIUM SERPL-MCNC: 9.6 MG/DL (ref 8.7–10.2)
CHLORIDE SERPL-SCNC: 104 MMOL/L (ref 96–106)
CHOLEST SERPL-MCNC: 147 MG/DL (ref 100–199)
CHOLEST/HDLC SERPL: 3.6 RATIO (ref 0–5)
CO2 SERPL-SCNC: 27 MMOL/L (ref 20–29)
CREAT SERPL-MCNC: 0.99 MG/DL (ref 0.76–1.27)
EOSINOPHIL # BLD AUTO: 0.2 X10E3/UL (ref 0–0.4)
EOSINOPHIL NFR BLD AUTO: 4 %
ERYTHROCYTE [DISTWIDTH] IN BLOOD BY AUTOMATED COUNT: 12.7 % (ref 11.6–15.4)
GLOBULIN SER CALC-MCNC: 2.6 G/DL (ref 1.5–4.5)
GLUCOSE SERPL-MCNC: 109 MG/DL (ref 65–99)
HCT VFR BLD AUTO: 45.8 % (ref 37.5–51)
HDLC SERPL-MCNC: 41 MG/DL
HGB BLD-MCNC: 15.4 G/DL (ref 13–17.7)
IMM GRANULOCYTES # BLD AUTO: 0 X10E3/UL (ref 0–0.1)
IMM GRANULOCYTES NFR BLD AUTO: 0 %
LDLC SERPL CALC-MCNC: 88 MG/DL (ref 0–99)
LYMPHOCYTES # BLD AUTO: 0.8 X10E3/UL (ref 0.7–3.1)
LYMPHOCYTES NFR BLD AUTO: 18 %
MCH RBC QN AUTO: 30 PG (ref 26.6–33)
MCHC RBC AUTO-ENTMCNC: 33.6 G/DL (ref 31.5–35.7)
MCV RBC AUTO: 89 FL (ref 79–97)
MONOCYTES # BLD AUTO: 0.6 X10E3/UL (ref 0.1–0.9)
MONOCYTES NFR BLD AUTO: 12 %
NEUTROPHILS # BLD AUTO: 2.9 X10E3/UL (ref 1.4–7)
NEUTROPHILS NFR BLD AUTO: 65 %
PLATELET # BLD AUTO: 248 X10E3/UL (ref 150–450)
POTASSIUM SERPL-SCNC: 5 MMOL/L (ref 3.5–5.2)
PROT SERPL-MCNC: 7.2 G/DL (ref 6–8.5)
PSA SERPL-MCNC: 3.8 NG/ML (ref 0–4)
RBC # BLD AUTO: 5.14 X10E6/UL (ref 4.14–5.8)
SODIUM SERPL-SCNC: 141 MMOL/L (ref 134–144)
TRIGL SERPL-MCNC: 97 MG/DL (ref 0–149)
TSH SERPL DL<=0.005 MIU/L-ACNC: 1.13 UIU/ML (ref 0.45–4.5)
VLDLC SERPL CALC-MCNC: 18 MG/DL (ref 5–40)
WBC # BLD AUTO: 4.5 X10E3/UL (ref 3.4–10.8)

## 2020-09-24 ENCOUNTER — TELEPHONE (OUTPATIENT)
Dept: FAMILY MEDICINE CLINIC | Facility: CLINIC | Age: 60
End: 2020-09-24

## 2020-09-24 NOTE — TELEPHONE ENCOUNTER
----- Message from Champ Choudhary MD sent at 9/24/2020 10:51 AM EDT -----  Please patient that labs were normal.

## 2020-09-29 ENCOUNTER — TELEPHONE (OUTPATIENT)
Dept: FAMILY MEDICINE CLINIC | Facility: CLINIC | Age: 60
End: 2020-09-29

## 2020-09-29 NOTE — TELEPHONE ENCOUNTER
Patient called and stated he checked with his insurance and they will cover the cologaurd. He would like to proceed with it. He can be reached at [phone]

## 2020-10-16 ENCOUNTER — TELEPHONE (OUTPATIENT)
Dept: FAMILY MEDICINE CLINIC | Facility: CLINIC | Age: 60
End: 2020-10-16

## 2020-10-16 NOTE — TELEPHONE ENCOUNTER
Patient LVM asking for cologarurd results. Notified that they received 10/9/20 and can take 2 weeks.

## 2020-12-14 ENCOUNTER — OFFICE VISIT (OUTPATIENT)
Dept: FAMILY MEDICINE CLINIC | Facility: CLINIC | Age: 60
End: 2020-12-14

## 2020-12-14 VITALS
OXYGEN SATURATION: 98 % | WEIGHT: 172.4 LBS | TEMPERATURE: 97.5 F | DIASTOLIC BLOOD PRESSURE: 82 MMHG | BODY MASS INDEX: 22.13 KG/M2 | RESPIRATION RATE: 16 BRPM | HEART RATE: 63 BPM | HEIGHT: 74 IN | SYSTOLIC BLOOD PRESSURE: 128 MMHG

## 2020-12-14 DIAGNOSIS — R06.02 SHORTNESS OF BREATH: Primary | ICD-10-CM

## 2020-12-14 DIAGNOSIS — J30.89 SEASONAL ALLERGIC RHINITIS DUE TO OTHER ALLERGIC TRIGGER: ICD-10-CM

## 2020-12-14 DIAGNOSIS — J02.9 SORE THROAT: ICD-10-CM

## 2020-12-14 PROCEDURE — 99214 OFFICE O/P EST MOD 30 MIN: CPT | Performed by: FAMILY MEDICINE

## 2020-12-14 RX ORDER — ALBUTEROL SULFATE 90 UG/1
2 AEROSOL, METERED RESPIRATORY (INHALATION) EVERY 6 HOURS PRN
Qty: 6.7 G | Refills: 2 | Status: SHIPPED | OUTPATIENT
Start: 2020-12-14 | End: 2022-10-06 | Stop reason: SDUPTHER

## 2020-12-14 NOTE — PROGRESS NOTES
Subjective   Beto Jo is a 60 y.o. male.     Chief Complaint   Patient presents with   • Shortness of Breath       Played golf this weekend, felt scratchy throat     Shortness of Breath  This is a new problem. The current episode started today. The problem occurs constantly. The problem has been unchanged. Associated symptoms include a sore throat. Pertinent negatives include no abdominal pain, chest pain, ear pain, fever, headaches, leg swelling, neck pain, rash, swollen glands, vomiting or wheezing. The symptoms are aggravated by weather changes. The patient has no known risk factors for DVT/PE. He has tried nothing for the symptoms.        The following portions of the patient's history were reviewed and updated as appropriate: allergies, current medications, past family history, past medical history, past social history, past surgical history and problem list.    Allergies:  No Known Allergies    Social History:  Social History     Socioeconomic History   • Marital status:      Spouse name: Not on file   • Number of children: Not on file   • Years of education: Not on file   • Highest education level: Not on file   Tobacco Use   • Smoking status: Never Smoker   • Smokeless tobacco: Never Used   Substance and Sexual Activity   • Alcohol use: No     Frequency: Never   • Drug use: No       Family History:  Family History   Problem Relation Age of Onset   • Heart disease Mother         cardiovascular, ischemic   • Diabetes Mother         mellitus   • Stroke Other         grandfather       Past Medical History :  Patient Active Problem List   Diagnosis   • Benign hypertension   • Mixed hyperlipidemia   • Underweight   • Colon cancer screening   • History of cardiac catheterization       Medication List:  Outpatient Encounter Medications as of 12/14/2020   Medication Sig Dispense Refill   • ADULT ASPIRIN LOW STRENGTH PO Take 1 tablet by mouth Daily.     • atenolol (TENORMIN) 50 MG tablet Take 1 tablet by  mouth Daily. 90 tablet 3   • hydroCHLOROthiazide (HYDRODIURIL) 25 MG tablet Take 1 tablet by mouth Daily. 90 tablet 3   • hydrocortisone (ANUSOL-HC) 2.5 % rectal cream Insert 1 application into the rectum.     • lisinopril (PRINIVIL,ZESTRIL) 10 MG tablet Take 1 tablet by mouth Daily. 90 tablet 3   • MULTIPLE VITAMIN PO Take  by mouth Daily.     • mupirocin (BACTROBAN) 2 % ointment Apply  topically to the appropriate area as directed Daily. 1 each 0   • naproxen sodium (ALEVE) 220 MG tablet Take 220 mg by mouth 2 (Two) Times a Day As Needed.     • simvastatin (ZOCOR) 20 MG tablet Take 1 tablet by mouth Daily. 90 tablet 3   • albuterol sulfate  (90 Base) MCG/ACT inhaler Inhale 2 puffs Every 6 (Six) Hours As Needed for Shortness of Air. 6.7 g 2     No facility-administered encounter medications on file as of 12/14/2020.        Past Surgical History:  Past Surgical History:   Procedure Laterality Date   • CARDIAC CATHETERIZATION  04/2007    40% LAD otherwise normal       Review of Systems:  Review of Systems   Constitutional: Negative for activity change, appetite change, fatigue and fever.   HENT: Positive for sore throat. Negative for ear pain, swollen glands and voice change.    Eyes: Negative for visual disturbance.   Respiratory: Positive for shortness of breath. Negative for wheezing.    Cardiovascular: Negative for chest pain and leg swelling.   Gastrointestinal: Negative for abdominal pain, blood in stool, constipation, diarrhea, nausea and vomiting.   Endocrine: Negative for polydipsia and polyuria.   Genitourinary: Negative for dysuria, frequency and hematuria.   Musculoskeletal: Negative for joint swelling, neck pain and neck stiffness.   Skin: Negative for rash and bruise.   Neurological: Negative for weakness, numbness and headache.   Psychiatric/Behavioral: Negative for suicidal ideas and depressed mood.       I have reviewed and confirmed the accuracy of the HPI and ROS as documented by the  "MA/JOHNN/RN Champ Choudhary MD    Vital Signs:  Visit Vitals  /82   Pulse 63   Temp 97.5 °F (36.4 °C)   Resp 16   Ht 188 cm (74\")   Wt 78.2 kg (172 lb 6.4 oz)   SpO2 98%   BMI 22.13 kg/m²       Physical Exam  Constitutional:       Appearance: He is well-developed.   HENT:      Head: Normocephalic and atraumatic.      Right Ear: External ear normal.      Left Ear: External ear normal.      Nose: Nose normal.   Eyes:      Pupils: Pupils are equal, round, and reactive to light.   Neck:      Musculoskeletal: Normal range of motion and neck supple.   Cardiovascular:      Rate and Rhythm: Normal rate and regular rhythm.      Heart sounds: Normal heart sounds.   Pulmonary:      Effort: Pulmonary effort is normal.      Breath sounds: Normal breath sounds.   Abdominal:      General: Bowel sounds are normal.      Palpations: Abdomen is soft.   Musculoskeletal: Normal range of motion.   Skin:     General: Skin is warm and dry.   Neurological:      Mental Status: He is alert and oriented to person, place, and time.   Psychiatric:         Behavior: Behavior normal.         Thought Content: Thought content normal.         Judgment: Judgment normal.       Assessment and Plan:  Problem List Items Addressed This Visit     None      Visit Diagnoses     Shortness of breath    -  Primary    Relevant Medications    albuterol sulfate  (90 Base) MCG/ACT inhaler    Other Relevant Orders    COVID-19,[facility] ROUTINE, NP/OP SWAB IN TRANSPORT MEDIA OR ESWAB 72 HR TAT - Swab, Anterior nasal    Sore throat        Relevant Orders    COVID-19,[facility] ROUTINE, NP/OP SWAB IN TRANSPORT MEDIA OR ESWAB 72 HR TAT - Swab, Anterior nasal    Seasonal allergic rhinitis due to other allergic trigger        try claritin or zyrtec OTC    Relevant Medications    albuterol sulfate  (90 Base) MCG/ACT inhaler      Findings discussed. All questions answered.  Differential diagnosis discussed.   Follow-up in approximately 3 days for " reevaluation if not improved.  Follow-up sooner for worsening symptoms or any concerns.  Follow-up for routine health maintenance as directed     An After Visit Summary and PPPS were given to the patient.       I wore protective equipment throughout this patient encounter to include mask and eye protection. Hand hygiene was performed before donning protective equipment and after removal when leaving the room.

## 2020-12-15 ENCOUNTER — TELEPHONE (OUTPATIENT)
Dept: FAMILY MEDICINE CLINIC | Facility: CLINIC | Age: 60
End: 2020-12-15

## 2020-12-15 LAB — SARS-COV-2 RNA RESP QL NAA+PROBE: NOT DETECTED

## 2020-12-15 NOTE — TELEPHONE ENCOUNTER
----- Message from Champ Choudhary MD sent at 12/15/2020  4:47 PM EST -----  Please notify patient that covid was negative

## 2021-03-22 ENCOUNTER — OFFICE VISIT (OUTPATIENT)
Dept: FAMILY MEDICINE CLINIC | Facility: CLINIC | Age: 61
End: 2021-03-22

## 2021-03-22 VITALS
HEART RATE: 68 BPM | HEIGHT: 74 IN | BODY MASS INDEX: 22.18 KG/M2 | OXYGEN SATURATION: 99 % | TEMPERATURE: 97.9 F | WEIGHT: 172.8 LBS | DIASTOLIC BLOOD PRESSURE: 72 MMHG | RESPIRATION RATE: 18 BRPM | SYSTOLIC BLOOD PRESSURE: 110 MMHG

## 2021-03-22 DIAGNOSIS — R42 DIZZINESS: Primary | ICD-10-CM

## 2021-03-22 DIAGNOSIS — I10 BENIGN HYPERTENSION: ICD-10-CM

## 2021-03-22 PROCEDURE — 93000 ELECTROCARDIOGRAM COMPLETE: CPT | Performed by: FAMILY MEDICINE

## 2021-03-22 PROCEDURE — 99214 OFFICE O/P EST MOD 30 MIN: CPT | Performed by: FAMILY MEDICINE

## 2021-03-22 NOTE — PROGRESS NOTES
Subjective   Beto Jo is a 60 y.o. male.     Pt with 1 week of dizziness, states when he closes his eyes he feels like he is going to tip over.  Also some intermittent diarrhea over the past few days. No fever/chills/myalgias. Has been some tired since starting hctz 25 last Fall.  No chest pain or palpitations. No SOA.     Dizziness  This is a new problem. The current episode started in the past 7 days. The problem occurs intermittently. Associated symptoms include fatigue. Pertinent negatives include no abdominal pain, chest pain, congestion, coughing, fever, headaches, joint swelling, nausea, neck pain, numbness, rash, sore throat, swollen glands, urinary symptoms, visual change, vomiting or weakness.        The following portions of the patient's history were reviewed and updated as appropriate: allergies, current medications, past family history, past medical history, past social history, past surgical history and problem list.    Patient Active Problem List   Diagnosis   • Benign hypertension   • Mixed hyperlipidemia   • Underweight   • Colon cancer screening   • History of cardiac catheterization       Current Outpatient Medications on File Prior to Visit   Medication Sig Dispense Refill   • ADULT ASPIRIN LOW STRENGTH PO Take 1 tablet by mouth Daily.     • albuterol sulfate  (90 Base) MCG/ACT inhaler Inhale 2 puffs Every 6 (Six) Hours As Needed for Shortness of Air. 6.7 g 2   • atenolol (TENORMIN) 50 MG tablet Take 1 tablet by mouth Daily. 90 tablet 3   • hydroCHLOROthiazide (HYDRODIURIL) 25 MG tablet Take 1 tablet by mouth Daily. 90 tablet 3   • hydrocortisone (ANUSOL-HC) 2.5 % rectal cream Insert 1 application into the rectum.     • lisinopril (PRINIVIL,ZESTRIL) 10 MG tablet Take 1 tablet by mouth Daily. 90 tablet 3   • MULTIPLE VITAMIN PO Take  by mouth Daily.     • naproxen sodium (ALEVE) 220 MG tablet Take 220 mg by mouth 2 (Two) Times a Day As Needed.     • simvastatin (ZOCOR) 20 MG tablet  Take 1 tablet by mouth Daily. 90 tablet 3   • [DISCONTINUED] mupirocin (BACTROBAN) 2 % ointment Apply  topically to the appropriate area as directed Daily. 1 each 0     No current facility-administered medications on file prior to visit.     Current outpatient and discharge medications have been reconciled for the patient.  Reviewed by: Champ Choudhary MD      No Known Allergies    Review of Systems   Constitutional: Positive for fatigue. Negative for activity change, appetite change and fever.   HENT: Negative for congestion, ear pain, sore throat, swollen glands and voice change.    Eyes: Negative for visual disturbance.   Respiratory: Negative for cough, shortness of breath and wheezing.    Cardiovascular: Negative for chest pain and leg swelling.   Gastrointestinal: Negative for abdominal pain, blood in stool, constipation, diarrhea, nausea and vomiting.   Endocrine: Negative for polydipsia and polyuria.   Genitourinary: Negative for dysuria, frequency and hematuria.   Musculoskeletal: Negative for joint swelling, neck pain and neck stiffness.   Skin: Negative for rash and bruise.   Neurological: Positive for dizziness. Negative for weakness, numbness and headache.   Psychiatric/Behavioral: Negative for suicidal ideas and depressed mood.     I have reviewed and confirmed the accuracy of the ROS as documented by the MA/LPN/RN Champ Choudhary MD    Objective   Vitals:    03/22/21 1647   BP: 110/72   Pulse: 68   Resp: 18   Temp: 97.9 °F (36.6 °C)   SpO2: 99%     Physical Exam  Constitutional:       Appearance: He is well-developed.   HENT:      Head: Normocephalic and atraumatic.      Right Ear: External ear normal.      Left Ear: External ear normal.      Nose: Nose normal.   Eyes:      Pupils: Pupils are equal, round, and reactive to light.   Cardiovascular:      Rate and Rhythm: Normal rate and regular rhythm.      Heart sounds: Normal heart sounds.   Pulmonary:      Effort: Pulmonary effort is  normal.      Breath sounds: Normal breath sounds.   Abdominal:      General: Bowel sounds are normal.      Palpations: Abdomen is soft.   Musculoskeletal:         General: Normal range of motion.      Cervical back: Normal range of motion and neck supple.   Skin:     General: Skin is warm and dry.   Neurological:      Mental Status: He is alert and oriented to person, place, and time.   Psychiatric:         Behavior: Behavior normal.         Thought Content: Thought content normal.         Judgment: Judgment normal.           ECG 12 Lead    Date/Time: 3/22/2021 5:02 PM  Performed by: Champ Choudhary MD  Authorized by: Champ Choudhary MD   Rhythm: sinus rhythm  Rate: normal  Conduction: conduction normal  ST Segments: ST segments normal  T Waves: T waves normal  QRS axis: normal  Other: no other findings    Clinical impression: normal ECG          Assessment/Plan .  Problem List Items Addressed This Visit     Benign hypertension    Current Assessment & Plan     Cut hctz in half          Relevant Orders    ECG 12 Lead    CBC Auto Differential    Comprehensive Metabolic Panel    TSH      Other Visit Diagnoses     Dizziness    -  Primary    Relevant Orders    ECG 12 Lead    CBC Auto Differential    Comprehensive Metabolic Panel    TSH    Sedimentation Rate       Possibly a little dehydrated. Findings discussed. All questions answered.  Differential diagnosis discussed.   Follow up in 2 weeks for re-evaluation, sooner for concerns.   Cut hctz in half pending receipt of labs. Check BP at  Home.     I wore protective equipment throughout this patient encounter to include mask and eye protection. Hand hygiene was performed before donning protective equipment and after removal when leaving the room

## 2021-03-28 LAB
ALBUMIN SERPL-MCNC: 4.4 G/DL (ref 3.8–4.9)
ALBUMIN/GLOB SERPL: 1.6 {RATIO} (ref 1.2–2.2)
ALP SERPL-CCNC: 55 IU/L (ref 39–117)
ALT SERPL-CCNC: 17 IU/L (ref 0–44)
AST SERPL-CCNC: 21 IU/L (ref 0–40)
BASOPHILS # BLD AUTO: 0.1 X10E3/UL (ref 0–0.2)
BASOPHILS NFR BLD AUTO: 1 %
BILIRUB SERPL-MCNC: 0.4 MG/DL (ref 0–1.2)
BUN SERPL-MCNC: 19 MG/DL (ref 6–24)
BUN/CREAT SERPL: 16 (ref 9–20)
CALCIUM SERPL-MCNC: 9.2 MG/DL (ref 8.7–10.2)
CHLORIDE SERPL-SCNC: 103 MMOL/L (ref 96–106)
CO2 SERPL-SCNC: 23 MMOL/L (ref 20–29)
CREAT SERPL-MCNC: 1.16 MG/DL (ref 0.76–1.27)
EOSINOPHIL # BLD AUTO: 0.2 X10E3/UL (ref 0–0.4)
EOSINOPHIL NFR BLD AUTO: 5 %
ERYTHROCYTE [DISTWIDTH] IN BLOOD BY AUTOMATED COUNT: 12.6 % (ref 11.6–15.4)
ERYTHROCYTE [SEDIMENTATION RATE] IN BLOOD BY WESTERGREN METHOD: 2 MM/HR (ref 0–30)
GLOBULIN SER CALC-MCNC: 2.7 G/DL (ref 1.5–4.5)
GLUCOSE SERPL-MCNC: 103 MG/DL (ref 65–99)
HCT VFR BLD AUTO: 43.5 % (ref 37.5–51)
HGB BLD-MCNC: 14.9 G/DL (ref 13–17.7)
IMM GRANULOCYTES # BLD AUTO: 0 X10E3/UL (ref 0–0.1)
IMM GRANULOCYTES NFR BLD AUTO: 0 %
LYMPHOCYTES # BLD AUTO: 1 X10E3/UL (ref 0.7–3.1)
LYMPHOCYTES NFR BLD AUTO: 21 %
MCH RBC QN AUTO: 29.8 PG (ref 26.6–33)
MCHC RBC AUTO-ENTMCNC: 34.3 G/DL (ref 31.5–35.7)
MCV RBC AUTO: 87 FL (ref 79–97)
MONOCYTES # BLD AUTO: 0.7 X10E3/UL (ref 0.1–0.9)
MONOCYTES NFR BLD AUTO: 14 %
NEUTROPHILS # BLD AUTO: 2.9 X10E3/UL (ref 1.4–7)
NEUTROPHILS NFR BLD AUTO: 59 %
PLATELET # BLD AUTO: 243 X10E3/UL (ref 150–450)
POTASSIUM SERPL-SCNC: 4.7 MMOL/L (ref 3.5–5.2)
PROT SERPL-MCNC: 7.1 G/DL (ref 6–8.5)
RBC # BLD AUTO: 5 X10E6/UL (ref 4.14–5.8)
SODIUM SERPL-SCNC: 141 MMOL/L (ref 134–144)
TSH SERPL DL<=0.005 MIU/L-ACNC: 1.22 UIU/ML (ref 0.45–4.5)
WBC # BLD AUTO: 4.8 X10E3/UL (ref 3.4–10.8)

## 2021-03-29 ENCOUNTER — TELEPHONE (OUTPATIENT)
Dept: FAMILY MEDICINE CLINIC | Facility: CLINIC | Age: 61
End: 2021-03-29

## 2021-03-29 NOTE — TELEPHONE ENCOUNTER
Called patient, left message on machine regarding results and to stay on the hctz 1/2 daily instead of whole pill

## 2021-03-29 NOTE — TELEPHONE ENCOUNTER
----- Message from Champ Choudhary MD sent at 3/29/2021  8:29 AM EDT -----  Please notify patient that labs were normal.

## 2021-04-05 ENCOUNTER — OFFICE VISIT (OUTPATIENT)
Dept: FAMILY MEDICINE CLINIC | Facility: CLINIC | Age: 61
End: 2021-04-05

## 2021-04-05 VITALS
HEIGHT: 74 IN | HEART RATE: 67 BPM | OXYGEN SATURATION: 98 % | SYSTOLIC BLOOD PRESSURE: 110 MMHG | DIASTOLIC BLOOD PRESSURE: 75 MMHG | BODY MASS INDEX: 22.23 KG/M2 | TEMPERATURE: 98.6 F | WEIGHT: 173.2 LBS | RESPIRATION RATE: 18 BRPM

## 2021-04-05 DIAGNOSIS — I10 BENIGN HYPERTENSION: Primary | ICD-10-CM

## 2021-04-05 PROCEDURE — 99213 OFFICE O/P EST LOW 20 MIN: CPT | Performed by: FAMILY MEDICINE

## 2021-04-05 NOTE — ASSESSMENT & PLAN NOTE
Stop hctz. Continue lisinopril and atenolol. Follow up in 6 months for recheck, sooner for worsening symptoms or any concerns.

## 2021-04-05 NOTE — PROGRESS NOTES
Subjective   Beto Jo is a 60 y.o. male.     Feeling better on hctz 12.5 qd. BP noted    Hypertension  This is a chronic problem. The current episode started more than 1 year ago. The problem is controlled. Pertinent negatives include no chest pain, neck pain or shortness of breath. Current antihypertension treatment includes ACE inhibitors, beta blockers and diuretics. There are no compliance problems.         The following portions of the patient's history were reviewed and updated as appropriate: allergies, current medications, past family history, past medical history, past social history, past surgical history and problem list.    Patient Active Problem List   Diagnosis   • Benign hypertension   • Mixed hyperlipidemia   • Underweight   • Colon cancer screening   • History of cardiac catheterization       Current Outpatient Medications on File Prior to Visit   Medication Sig Dispense Refill   • ADULT ASPIRIN LOW STRENGTH PO Take 1 tablet by mouth Daily.     • albuterol sulfate  (90 Base) MCG/ACT inhaler Inhale 2 puffs Every 6 (Six) Hours As Needed for Shortness of Air. 6.7 g 2   • atenolol (TENORMIN) 50 MG tablet Take 1 tablet by mouth Daily. 90 tablet 3   • hydrocortisone (ANUSOL-HC) 2.5 % rectal cream Insert 1 application into the rectum.     • lisinopril (PRINIVIL,ZESTRIL) 10 MG tablet Take 1 tablet by mouth Daily. 90 tablet 3   • MULTIPLE VITAMIN PO Take  by mouth Daily.     • naproxen sodium (ALEVE) 220 MG tablet Take 220 mg by mouth 2 (Two) Times a Day As Needed.     • simvastatin (ZOCOR) 20 MG tablet Take 1 tablet by mouth Daily. 90 tablet 3   • [DISCONTINUED] hydroCHLOROthiazide (HYDRODIURIL) 25 MG tablet Take 1 tablet by mouth Daily. 90 tablet 3     No current facility-administered medications on file prior to visit.     Current outpatient and discharge medications have been reconciled for the patient.  Reviewed by: Champ Choudhary MD      No Known Allergies    Review of Systems    Constitutional: Negative for activity change, appetite change, fatigue and fever.   HENT: Negative for ear pain, swollen glands and voice change.    Eyes: Negative for visual disturbance.   Respiratory: Negative for shortness of breath and wheezing.    Cardiovascular: Negative for chest pain and leg swelling.   Gastrointestinal: Negative for abdominal pain, blood in stool, constipation, diarrhea, nausea and vomiting.   Endocrine: Negative for polydipsia and polyuria.   Genitourinary: Negative for dysuria, frequency and hematuria.   Musculoskeletal: Negative for joint swelling, neck pain and neck stiffness.   Skin: Negative for rash and bruise.   Neurological: Negative for weakness, numbness and headache.   Psychiatric/Behavioral: Negative for suicidal ideas and depressed mood.     I have reviewed and confirmed the accuracy of the ROS as documented by the MA/LPN/RN Champ Choudhary MD    Objective   Vitals:    04/05/21 1624   BP: 110/75   Pulse: 67   Resp: 18   Temp: 98.6 °F (37 °C)   SpO2: 98%     Physical Exam  Constitutional:       Appearance: He is well-developed.   HENT:      Head: Normocephalic and atraumatic.      Right Ear: External ear normal.      Left Ear: External ear normal.      Nose: Nose normal.   Eyes:      Pupils: Pupils are equal, round, and reactive to light.   Cardiovascular:      Rate and Rhythm: Normal rate and regular rhythm.      Heart sounds: Normal heart sounds.   Pulmonary:      Effort: Pulmonary effort is normal.      Breath sounds: Normal breath sounds.   Abdominal:      General: Bowel sounds are normal.      Palpations: Abdomen is soft.   Musculoskeletal:         General: Normal range of motion.      Cervical back: Normal range of motion and neck supple.   Skin:     General: Skin is warm and dry.   Neurological:      Mental Status: He is alert and oriented to person, place, and time.   Psychiatric:         Behavior: Behavior normal.         Thought Content: Thought content  normal.         Judgment: Judgment normal.             Assessment/Plan .  Problem List Items Addressed This Visit     Benign hypertension - Primary    Current Assessment & Plan     Stop hctz. Continue lisinopril and atenolol. Follow up in 6 months for recheck, sooner for worsening symptoms or any concerns.               Findings discussed. All questions answered.  Medication and medication adverse effects discussed.  Drug education given and explained to patient. Patient verbalized understanding.  Follow up in 6 months for recheck, sooner for worsening symptoms or any concerns.   Follow-up for routine health maintenance as directed     I wore protective equipment throughout this patient encounter to include mask and eye protection. Hand hygiene was performed before donning protective equipment and after removal when leaving the room

## 2021-08-03 DIAGNOSIS — E78.2 MIXED HYPERLIPIDEMIA: ICD-10-CM

## 2021-08-03 DIAGNOSIS — I10 BENIGN HYPERTENSION: ICD-10-CM

## 2021-08-03 RX ORDER — ATENOLOL 50 MG/1
50 TABLET ORAL DAILY
Qty: 90 TABLET | Refills: 3 | Status: SHIPPED | OUTPATIENT
Start: 2021-08-03 | End: 2021-10-04 | Stop reason: SDUPTHER

## 2021-08-03 RX ORDER — SIMVASTATIN 20 MG
20 TABLET ORAL DAILY
Qty: 90 TABLET | Refills: 3 | Status: SHIPPED | OUTPATIENT
Start: 2021-08-03 | End: 2021-09-20 | Stop reason: SDUPTHER

## 2021-08-03 RX ORDER — LISINOPRIL 10 MG/1
10 TABLET ORAL DAILY
Qty: 90 TABLET | Refills: 3 | Status: SHIPPED | OUTPATIENT
Start: 2021-08-03 | End: 2021-09-20 | Stop reason: SDUPTHER

## 2021-08-03 NOTE — TELEPHONE ENCOUNTER
Caller: BRITTNI BOCANEGRA    Relationship: Emergency Contact    Best call back number: [phone]    Medication needed:   Requested Prescriptions     Pending Prescriptions Disp Refills   • simvastatin (ZOCOR) 20 MG tablet 90 tablet 3     Sig: Take 1 tablet by mouth Daily.   • atenolol (TENORMIN) 50 MG tablet 90 tablet 3     Sig: Take 1 tablet by mouth Daily.   • lisinopril (PRINIVIL,ZESTRIL) 10 MG tablet 90 tablet 3     Sig: Take 1 tablet by mouth Daily.       When do you need the refill by: ASAP DUE TO MAIL ORDER     What additional details did the patient provide when requesting the medication: PATIENT HAS A MONTH SUPPLY     Does the patient have less than a 3 day supply:  [] Yes  [x] No    What is the patient's preferred pharmacy: [facility] [facility] DELIVERY [facility], [place] - [address] [phone] [facility] [phone] [employer]

## 2021-09-20 ENCOUNTER — OFFICE VISIT (OUTPATIENT)
Dept: FAMILY MEDICINE CLINIC | Facility: CLINIC | Age: 61
End: 2021-09-20

## 2021-09-20 VITALS
TEMPERATURE: 97.5 F | HEART RATE: 69 BPM | SYSTOLIC BLOOD PRESSURE: 143 MMHG | DIASTOLIC BLOOD PRESSURE: 85 MMHG | HEIGHT: 74 IN | BODY MASS INDEX: 22.61 KG/M2 | WEIGHT: 176.2 LBS | RESPIRATION RATE: 18 BRPM | OXYGEN SATURATION: 98 %

## 2021-09-20 DIAGNOSIS — R22.31 LOCALIZED SWELLING OF RIGHT THUMB: Primary | ICD-10-CM

## 2021-09-20 PROCEDURE — 99213 OFFICE O/P EST LOW 20 MIN: CPT | Performed by: FAMILY MEDICINE

## 2021-09-20 RX ORDER — PREDNISONE 20 MG/1
40 TABLET ORAL DAILY
Qty: 10 TABLET | Refills: 0 | Status: SHIPPED | OUTPATIENT
Start: 2021-09-20 | End: 2021-09-25

## 2021-09-20 RX ORDER — CEPHALEXIN 500 MG/1
500 CAPSULE ORAL 3 TIMES DAILY
Qty: 30 CAPSULE | Refills: 0 | Status: SHIPPED | OUTPATIENT
Start: 2021-09-20 | End: 2021-09-30

## 2021-09-20 NOTE — PROGRESS NOTES
Subjective   Beto Jo is a 60 y.o. male.   Chief Complaint   Patient presents with   • thumb swelling       No pain, seems to get some  better with benadryl     Hand Pain   The incident occurred 3 to 5 days ago. The incident occurred at home. There was no injury mechanism. Pain location: right thumb. Quality: swelling. The pain does not radiate. The pain is at a severity of 0/10. The patient is experiencing no pain. He has tried nothing for the symptoms. The treatment provided no relief.        The following portions of the patient's history were reviewed and updated as appropriate: allergies, current medications, past family history, past medical history, past social history, past surgical history and problem list.    Patient Active Problem List   Diagnosis   • Benign hypertension   • Mixed hyperlipidemia   • Underweight   • Colon cancer screening   • History of cardiac catheterization       Current Outpatient Medications on File Prior to Visit   Medication Sig Dispense Refill   • ADULT ASPIRIN LOW STRENGTH PO Take 1 tablet by mouth Daily.     • albuterol sulfate  (90 Base) MCG/ACT inhaler Inhale 2 puffs Every 6 (Six) Hours As Needed for Shortness of Air. 6.7 g 2   • atenolol (TENORMIN) 50 MG tablet Take 1 tablet by mouth Daily. 90 tablet 3   • hydrocortisone (ANUSOL-HC) 2.5 % rectal cream Insert 1 application into the rectum.     • lisinopril (PRINIVIL,ZESTRIL) 10 MG tablet Take 1 tablet by mouth Daily. 90 tablet 3   • MULTIPLE VITAMIN PO Take  by mouth Daily.     • naproxen sodium (ALEVE) 220 MG tablet Take 220 mg by mouth 2 (Two) Times a Day As Needed.     • simvastatin (ZOCOR) 20 MG tablet Take 1 tablet by mouth Daily. 90 tablet 3   • [DISCONTINUED] atenolol (TENORMIN) 50 MG tablet Take 1 tablet by mouth Daily. 90 tablet 3   • [DISCONTINUED] lisinopril (PRINIVIL,ZESTRIL) 10 MG tablet Take 1 tablet by mouth Daily. 90 tablet 3   • [DISCONTINUED] simvastatin (ZOCOR) 20 MG tablet Take 1 tablet by mouth  "Daily. 90 tablet 3     No current facility-administered medications on file prior to visit.     Current outpatient and discharge medications have been reconciled for the patient.  Reviewed by: Champ Choudahry MD      No Known Allergies    Review of Systems   Constitutional: Negative for fever.   Musculoskeletal: Positive for joint swelling. Negative for arthralgias.   Skin: Positive for color change. Negative for rash.   All other systems reviewed and are negative.    I have reviewed and confirmed the accuracy of the ROS as documented by the MA/LPN/RN Champ Choudhary MD    Objective   Visit Vitals  /85 (BP Location: Right arm, Patient Position: Sitting, Cuff Size: Adult)   Pulse 69   Temp 97.5 °F (36.4 °C)   Resp 18   Ht 188 cm (74\")   Wt 79.9 kg (176 lb 3.2 oz)   SpO2 98%   BMI 22.62 kg/m²       Physical Exam  Constitutional:       Appearance: He is well-developed.   HENT:      Head: Normocephalic and atraumatic.      Right Ear: External ear normal.      Left Ear: External ear normal.      Nose: Nose normal.   Eyes:      Pupils: Pupils are equal, round, and reactive to light.   Cardiovascular:      Rate and Rhythm: Normal rate and regular rhythm.      Heart sounds: Normal heart sounds.   Pulmonary:      Effort: Pulmonary effort is normal.      Breath sounds: Normal breath sounds.   Abdominal:      General: Bowel sounds are normal.      Palpations: Abdomen is soft.   Musculoskeletal:         General: Normal range of motion.        Hands:       Cervical back: Normal range of motion and neck supple.      Comments: Minimal erythema and significant nontender edema right thumb.   Skin:     General: Skin is warm and dry.   Neurological:      Mental Status: He is alert and oriented to person, place, and time.   Psychiatric:         Behavior: Behavior normal.         Thought Content: Thought content normal.         Judgment: Judgment normal.         Assessment/Plan .    Diagnoses and all orders for this " visit:    1. Localized swelling of right thumb (Primary)  -     cephalexin (KEFLEX) 500 MG capsule; Take 1 capsule by mouth 3 (Three) Times a Day for 10 days.  Dispense: 30 capsule; Refill: 0  -     predniSONE (DELTASONE) 20 MG tablet; Take 2 tablets by mouth Daily for 5 days.  Dispense: 10 tablet; Refill: 0     Findings discussed. All questions answered.  Differential diagnosis discussed.   ? Localized insect bite with allergic reaction. Tx empirically. Follow-up in approximately 3 days for reevaluation if not improved.  Follow-up sooner for worsening symptoms or any concerns. Xray and labs if persistent. Warning signs for infection discussed.       I wore protective equipment throughout this patient encounter to include mask and gloves. Hand hygiene was performed before donning protective equipment and after removal when leaving the room

## 2021-09-24 ENCOUNTER — TELEPHONE (OUTPATIENT)
Dept: FAMILY MEDICINE CLINIC | Facility: CLINIC | Age: 61
End: 2021-09-24

## 2021-09-24 DIAGNOSIS — M25.50 ARTHRALGIA, UNSPECIFIED JOINT: Primary | ICD-10-CM

## 2021-09-24 NOTE — TELEPHONE ENCOUNTER
Caller: Beto Jo    Relationship: Self    Best call back number:  [phone]    What is the best time to reach you: ANYTIME     Who are you requesting to speak with (clinical staff, provider,  specific staff member): CLINICAL STAFF     Do you know the name of the person who called: N/A     What was the call regarding: FOLLOWING FROM APPOINTMENT ON Monday ABOUT PATIENT HAND     Do you require a callback: YES

## 2021-09-24 NOTE — TELEPHONE ENCOUNTER
Spoke with pt. He states his thumb is better but now pain is in wrist and radiating to elbow. Notified pt per Dr. Choudhary he would like to get labs on pt: cbc, esr, crp, hafsa, rf, and uric acid. Order placed-pt will go to [facility] to have done.

## 2021-09-25 LAB
BASOPHILS # BLD AUTO: 0 X10E3/UL (ref 0–0.2)
BASOPHILS NFR BLD AUTO: 0 %
CRP SERPL-MCNC: 5 MG/L (ref 0–10)
EOSINOPHIL # BLD AUTO: 0 X10E3/UL (ref 0–0.4)
EOSINOPHIL NFR BLD AUTO: 0 %
ERYTHROCYTE [DISTWIDTH] IN BLOOD BY AUTOMATED COUNT: 12.9 % (ref 11.6–15.4)
ERYTHROCYTE [SEDIMENTATION RATE] IN BLOOD BY WESTERGREN METHOD: 2 MM/HR (ref 0–30)
HCT VFR BLD AUTO: 43.8 % (ref 37.5–51)
HGB BLD-MCNC: 14.4 G/DL (ref 13–17.7)
IMM GRANULOCYTES # BLD AUTO: 0.1 X10E3/UL (ref 0–0.1)
IMM GRANULOCYTES NFR BLD AUTO: 1 %
LYMPHOCYTES # BLD AUTO: 1 X10E3/UL (ref 0.7–3.1)
LYMPHOCYTES NFR BLD AUTO: 10 %
MCH RBC QN AUTO: 29.4 PG (ref 26.6–33)
MCHC RBC AUTO-ENTMCNC: 32.9 G/DL (ref 31.5–35.7)
MCV RBC AUTO: 90 FL (ref 79–97)
MONOCYTES # BLD AUTO: 0.9 X10E3/UL (ref 0.1–0.9)
MONOCYTES NFR BLD AUTO: 10 %
NEUTROPHILS # BLD AUTO: 7.2 X10E3/UL (ref 1.4–7)
NEUTROPHILS NFR BLD AUTO: 79 %
PLATELET # BLD AUTO: 256 X10E3/UL (ref 150–450)
RBC # BLD AUTO: 4.89 X10E6/UL (ref 4.14–5.8)
RHEUMATOID FACT SERPL-ACNC: <10 IU/ML (ref 0–13.9)
URATE SERPL-MCNC: 6.1 MG/DL (ref 3.8–8.4)
WBC # BLD AUTO: 9.2 X10E3/UL (ref 3.4–10.8)

## 2021-09-26 LAB
ANA TITR SER IF: NEGATIVE {TITER}
LABORATORY COMMENT REPORT: NORMAL

## 2021-09-27 ENCOUNTER — TELEPHONE (OUTPATIENT)
Dept: FAMILY MEDICINE CLINIC | Facility: CLINIC | Age: 61
End: 2021-09-27

## 2021-09-27 NOTE — TELEPHONE ENCOUNTER
Caller: GASTON    Phone Number: [phone]    Reason for Call: PATIENT HAD BLOODWORK DONE ON 09/24/21 AND ON THE [facility] IS NEEDING THE RESULTS FAXED TO THEIR OFFICE. FAX NUMBER IS:   [phone]

## 2021-09-27 NOTE — TELEPHONE ENCOUNTER
----- Message from Champ Choudhary MD sent at 9/27/2021  8:11 AM EDT -----  Please notify patient that labs were normal.  
Called and lmom for patient  
Attending note (Kevin): 75y/o F with h/o IDDM, ? dementia, lives alone, brought to ED by family friend, apparently not taking care of herself; house very messy.  left water running in bathroom.  Reportedly not taking her home medications, had elevated glucose.     son comes once/week to set up medications  per patient has not been taking her medication b/c she ran out; last time she gave herself insulin was approx 2 weeks ago (according to patient).  No fever/chills.  No nausea/vomiting.      Son: Austin Quarlesjacque: [phone]

## 2021-09-28 ENCOUNTER — TELEPHONE (OUTPATIENT)
Dept: FAMILY MEDICINE CLINIC | Facility: CLINIC | Age: 61
End: 2021-09-28

## 2021-09-28 NOTE — TELEPHONE ENCOUNTER
----- Message from Champ Choudhary MD sent at 9/24/2021  1:27 PM EDT -----  Regarding: FW: Prescription Question  Contact: [phone]      ----- Message -----  From: Jeaneth Faust MA  Sent: 9/24/2021  10:25 AM EDT  To: Champ Choudhary MD  Subject: FW: Prescription Question                          ----- Message -----  From: Beto Jo  Sent: 9/24/2021  10:16 AM EDT  To: Bhargav Solorio  Clinical Pool  Subject: Prescription Question                            Lab said could' be a week for results. Could I have something for pain ? Can be  filled at [facility].   Thanks

## 2021-09-28 NOTE — TELEPHONE ENCOUNTER
Per Dr. Choudhary: Diclofenac 50 mg bid. Where does he hurt? That is new.   Spoke with pt. States he is having swelling and pain in wrist and he went to chiropractor yesterday and thinks that is helping. He Ia also using kt tape and feels like he's getting better with that as well. Informed him to follow up if doesn't improve.

## 2021-10-04 ENCOUNTER — OFFICE VISIT (OUTPATIENT)
Dept: FAMILY MEDICINE CLINIC | Facility: CLINIC | Age: 61
End: 2021-10-04

## 2021-10-04 VITALS
TEMPERATURE: 97.3 F | SYSTOLIC BLOOD PRESSURE: 120 MMHG | OXYGEN SATURATION: 98 % | DIASTOLIC BLOOD PRESSURE: 80 MMHG | BODY MASS INDEX: 22.05 KG/M2 | RESPIRATION RATE: 18 BRPM | WEIGHT: 171.8 LBS | HEIGHT: 74 IN | HEART RATE: 71 BPM

## 2021-10-04 DIAGNOSIS — I10 BENIGN HYPERTENSION: ICD-10-CM

## 2021-10-04 DIAGNOSIS — Z00.00 ANNUAL PHYSICAL EXAM: Primary | ICD-10-CM

## 2021-10-04 DIAGNOSIS — Z12.5 PROSTATE CANCER SCREENING: ICD-10-CM

## 2021-10-04 DIAGNOSIS — M25.50 ARTHRALGIA, UNSPECIFIED JOINT: ICD-10-CM

## 2021-10-04 DIAGNOSIS — Z11.59 NEED FOR HEPATITIS C SCREENING TEST: ICD-10-CM

## 2021-10-04 DIAGNOSIS — E78.2 MIXED HYPERLIPIDEMIA: ICD-10-CM

## 2021-10-04 LAB
BILIRUB BLD-MCNC: NEGATIVE MG/DL
CLARITY, POC: CLEAR
COLOR UR: YELLOW
GLUCOSE UR STRIP-MCNC: NEGATIVE MG/DL
KETONES UR QL: NEGATIVE
LEUKOCYTE EST, POC: NEGATIVE
NITRITE UR-MCNC: NEGATIVE MG/ML
PH UR: 5 [PH] (ref 5–8)
PROT UR STRIP-MCNC: ABNORMAL MG/DL
RBC # UR STRIP: NEGATIVE /UL
SP GR UR: 1.01 (ref 1–1.03)
UROBILINOGEN UR QL: NORMAL

## 2021-10-04 PROCEDURE — 99396 PREV VISIT EST AGE 40-64: CPT | Performed by: FAMILY MEDICINE

## 2021-10-04 PROCEDURE — 81003 URINALYSIS AUTO W/O SCOPE: CPT | Performed by: FAMILY MEDICINE

## 2021-10-04 RX ORDER — LISINOPRIL 10 MG/1
10 TABLET ORAL DAILY
Qty: 90 TABLET | Refills: 3 | Status: SHIPPED | OUTPATIENT
Start: 2021-10-04 | End: 2022-10-06 | Stop reason: SDUPTHER

## 2021-10-04 RX ORDER — ATENOLOL 50 MG/1
50 TABLET ORAL DAILY
Qty: 90 TABLET | Refills: 3 | Status: SHIPPED | OUTPATIENT
Start: 2021-10-04 | End: 2022-10-06 | Stop reason: SDUPTHER

## 2021-10-04 RX ORDER — SIMVASTATIN 20 MG
20 TABLET ORAL DAILY
Qty: 90 TABLET | Refills: 3 | Status: SHIPPED | OUTPATIENT
Start: 2021-10-04 | End: 2022-10-06 | Stop reason: SDUPTHER

## 2021-10-04 NOTE — PROGRESS NOTES
Subjective   Beto Jo is a 61 y.o. male.   Chief Complaint   Patient presents with   • Annual Exam   • Hypertension       The patient is here: for coordination of medical care and annual wellness examination.  Patient's last comprehensive physical was on 9/22/2020.  Patient's previous physician was Champ Choudhary Patient's last tetanus shot was unknown. Current pain scale 0/10.    Previously hand swelling resolved spontaneously.     Hypertension  This is a chronic problem. The current episode started more than 1 year ago. The problem is controlled. Pertinent negatives include no chest pain, neck pain or shortness of breath. Current antihypertension treatment includes ACE inhibitors, beta blockers and diuretics. There are no compliance problems.         The following portions of the patient's history were reviewed and updated as appropriate: allergies, current medications, past family history, past medical history, past social history, past surgical history and problem list.    Patient Active Problem List   Diagnosis   • Benign hypertension   • Mixed hyperlipidemia   • Underweight   • Colon cancer screening   • History of cardiac catheterization       Current Outpatient Medications on File Prior to Visit   Medication Sig Dispense Refill   • ADULT ASPIRIN LOW STRENGTH PO Take 1 tablet by mouth Daily.     • albuterol sulfate  (90 Base) MCG/ACT inhaler Inhale 2 puffs Every 6 (Six) Hours As Needed for Shortness of Air. 6.7 g 2   • hydrocortisone (ANUSOL-HC) 2.5 % rectal cream Insert 1 application into the rectum.     • MULTIPLE VITAMIN PO Take  by mouth Daily.     • naproxen sodium (ALEVE) 220 MG tablet Take 220 mg by mouth 2 (Two) Times a Day As Needed.     • [DISCONTINUED] atenolol (TENORMIN) 50 MG tablet Take 1 tablet by mouth Daily. 90 tablet 3   • [DISCONTINUED] lisinopril (PRINIVIL,ZESTRIL) 10 MG tablet Take 1 tablet by mouth Daily. 90 tablet 3   • [DISCONTINUED] simvastatin (ZOCOR) 20 MG tablet Take 1  "tablet by mouth Daily. 90 tablet 3     No current facility-administered medications on file prior to visit.     Current outpatient and discharge medications have been reconciled for the patient.  Reviewed by: Champ Choudhary MD      No Known Allergies    Review of Systems   Constitutional: Negative for activity change, appetite change, fatigue and fever.   HENT: Negative for ear pain, swollen glands and voice change.    Eyes: Negative for visual disturbance.   Respiratory: Negative for shortness of breath and wheezing.    Cardiovascular: Negative for chest pain and leg swelling.   Gastrointestinal: Negative for abdominal pain, blood in stool, constipation, diarrhea, nausea and vomiting.   Endocrine: Negative for polydipsia and polyuria.   Genitourinary: Negative for dysuria, frequency and hematuria.   Musculoskeletal: Negative for joint swelling, neck pain and neck stiffness.   Skin: Negative for rash and bruise.   Neurological: Negative for weakness, numbness and headache.   Psychiatric/Behavioral: Negative for suicidal ideas and depressed mood.     I have reviewed and confirmed the accuracy of the ROS as documented by the MA/LPN/RN Champ Choudhary MD    Objective   Visit Vitals  /80 (BP Location: Right arm, Patient Position: Sitting, Cuff Size: Adult)   Pulse 71   Temp 97.3 °F (36.3 °C)   Resp 18   Ht 188 cm (74\")   Wt 77.9 kg (171 lb 12.8 oz)   SpO2 98%   BMI 22.06 kg/m²       Physical Exam  Constitutional:       Appearance: He is well-developed.   HENT:      Head: Normocephalic and atraumatic.      Right Ear: External ear normal.      Left Ear: External ear normal.      Nose: Nose normal.   Eyes:      Pupils: Pupils are equal, round, and reactive to light.   Cardiovascular:      Rate and Rhythm: Normal rate and regular rhythm.      Heart sounds: Normal heart sounds.   Pulmonary:      Effort: Pulmonary effort is normal.      Breath sounds: Normal breath sounds.   Abdominal:      General: Bowel " sounds are normal.      Palpations: Abdomen is soft.   Musculoskeletal:         General: Normal range of motion.      Cervical back: Normal range of motion and neck supple.   Skin:     General: Skin is warm and dry.   Neurological:      Mental Status: He is alert and oriented to person, place, and time.   Psychiatric:         Behavior: Behavior normal.         Thought Content: Thought content normal.         Judgment: Judgment normal.         Assessment/Plan .  Patient's Body mass index is 22.06 kg/m². indicating that he is within normal range (BMI 18.5-24.9). No BMI management plan needed..     Beto Jo  reports that he has never smoked. He has never used smokeless tobacco..  Diagnoses and all orders for this visit:    1. Annual physical exam (Primary)  -     POC Urinalysis Dipstick, Automated  -     CBC Auto Differential  -     Comprehensive Metabolic Panel  -     Lipid Panel With / Chol / HDL Ratio  -     PSA Screen  -     TSH  -     Uric Acid    2. Benign hypertension  -     CBC Auto Differential  -     Comprehensive Metabolic Panel  -     lisinopril (PRINIVIL,ZESTRIL) 10 MG tablet; Take 1 tablet by mouth Daily.  Dispense: 90 tablet; Refill: 3  -     atenolol (TENORMIN) 50 MG tablet; Take 1 tablet by mouth Daily.  Dispense: 90 tablet; Refill: 3    3. Need for hepatitis C screening test    4. Benign hypertension  Comments:  restart hctz, continue lisinopril and atenolol   Orders:  -     CBC Auto Differential  -     Comprehensive Metabolic Panel  -     lisinopril (PRINIVIL,ZESTRIL) 10 MG tablet; Take 1 tablet by mouth Daily.  Dispense: 90 tablet; Refill: 3  -     atenolol (TENORMIN) 50 MG tablet; Take 1 tablet by mouth Daily.  Dispense: 90 tablet; Refill: 3    5. Mixed hyperlipidemia  Overview:  pt needs 1 month refill untill follow up    Orders:  -     Lipid Panel With / Chol / HDL Ratio  -     TSH  -     simvastatin (ZOCOR) 20 MG tablet; Take 1 tablet by mouth Daily.  Dispense: 90 tablet; Refill: 3    6.  Arthralgia, unspecified joint  -     Uric Acid    7. Prostate cancer screening  -     PSA Screen   Discussed anticipatory guidance, diet, exercise, and weight loss.  Discussed safety and routine screening examinations.  Discussed self-examinations.  Findings discussed. All questions answered.  Medication and medication adverse effects discussed.  Drug education given and explained to patient. Patient verbalized understanding.  Follow-up for routine health maintenance as directed       I wore protective equipment throughout this patient encounter to include mask and gloves. Hand hygiene was performed before donning protective equipment and after removal when leaving the room

## 2021-10-05 ENCOUNTER — TELEPHONE (OUTPATIENT)
Dept: FAMILY MEDICINE CLINIC | Facility: CLINIC | Age: 61
End: 2021-10-05

## 2021-10-05 LAB
ALBUMIN SERPL-MCNC: 4.2 G/DL (ref 3.8–4.9)
ALBUMIN/GLOB SERPL: 1.8 {RATIO} (ref 1.2–2.2)
ALP SERPL-CCNC: 67 IU/L (ref 44–121)
ALT SERPL-CCNC: 18 IU/L (ref 0–44)
AST SERPL-CCNC: 18 IU/L (ref 0–40)
BASOPHILS # BLD AUTO: 0.1 X10E3/UL (ref 0–0.2)
BASOPHILS NFR BLD AUTO: 1 %
BILIRUB SERPL-MCNC: 0.3 MG/DL (ref 0–1.2)
BUN SERPL-MCNC: 15 MG/DL (ref 8–27)
BUN/CREAT SERPL: 13 (ref 10–24)
CALCIUM SERPL-MCNC: 8.9 MG/DL (ref 8.6–10.2)
CHLORIDE SERPL-SCNC: 102 MMOL/L (ref 96–106)
CHOLEST SERPL-MCNC: 172 MG/DL (ref 100–199)
CHOLEST/HDLC SERPL: 4.6 RATIO (ref 0–5)
CO2 SERPL-SCNC: 23 MMOL/L (ref 20–29)
CREAT SERPL-MCNC: 1.13 MG/DL (ref 0.76–1.27)
EOSINOPHIL # BLD AUTO: 0.2 X10E3/UL (ref 0–0.4)
EOSINOPHIL NFR BLD AUTO: 4 %
ERYTHROCYTE [DISTWIDTH] IN BLOOD BY AUTOMATED COUNT: 12.7 % (ref 11.6–15.4)
GLOBULIN SER CALC-MCNC: 2.3 G/DL (ref 1.5–4.5)
GLUCOSE SERPL-MCNC: 106 MG/DL (ref 65–99)
HCT VFR BLD AUTO: 45.9 % (ref 37.5–51)
HDLC SERPL-MCNC: 37 MG/DL
HGB BLD-MCNC: 15.8 G/DL (ref 13–17.7)
IMM GRANULOCYTES # BLD AUTO: 0 X10E3/UL (ref 0–0.1)
IMM GRANULOCYTES NFR BLD AUTO: 0 %
LDLC SERPL CALC-MCNC: 102 MG/DL (ref 0–99)
LYMPHOCYTES # BLD AUTO: 1.2 X10E3/UL (ref 0.7–3.1)
LYMPHOCYTES NFR BLD AUTO: 20 %
MCH RBC QN AUTO: 30.3 PG (ref 26.6–33)
MCHC RBC AUTO-ENTMCNC: 34.4 G/DL (ref 31.5–35.7)
MCV RBC AUTO: 88 FL (ref 79–97)
MONOCYTES # BLD AUTO: 0.6 X10E3/UL (ref 0.1–0.9)
MONOCYTES NFR BLD AUTO: 11 %
NEUTROPHILS # BLD AUTO: 3.7 X10E3/UL (ref 1.4–7)
NEUTROPHILS NFR BLD AUTO: 64 %
PLATELET # BLD AUTO: 262 X10E3/UL (ref 150–450)
POTASSIUM SERPL-SCNC: 4.7 MMOL/L (ref 3.5–5.2)
PROT SERPL-MCNC: 6.5 G/DL (ref 6–8.5)
PSA SERPL-MCNC: 4.9 NG/ML (ref 0–4)
RBC # BLD AUTO: 5.22 X10E6/UL (ref 4.14–5.8)
SODIUM SERPL-SCNC: 138 MMOL/L (ref 134–144)
TRIGL SERPL-MCNC: 192 MG/DL (ref 0–149)
TSH SERPL DL<=0.005 MIU/L-ACNC: 1.09 UIU/ML (ref 0.45–4.5)
URATE SERPL-MCNC: 6.7 MG/DL (ref 3.8–8.4)
VLDLC SERPL CALC-MCNC: 33 MG/DL (ref 5–40)
WBC # BLD AUTO: 5.7 X10E3/UL (ref 3.4–10.8)

## 2021-10-05 NOTE — TELEPHONE ENCOUNTER
Caller: Beto Jo    Relationship: Self    Best call back number:[phone]    What specialty or service is being requested: UROLOGIST      Any additional details: PATIENT IS ASKING FOR A REFERRAL TO UROLOGIST THAT IS WITHIN THE [facility].     PLEASE CALL TO ADVISE

## 2021-10-05 NOTE — TELEPHONE ENCOUNTER
Spoke with wife. They will call insurance to find out who he can see in his network and let us know.

## 2021-10-05 NOTE — TELEPHONE ENCOUNTER
----- Message from Champ Choudhary MD sent at 10/5/2021  8:23 AM EDT -----  Please notify patient that:   PSA was slightly elevated - [facility] urology eval. Please disregard previous message as all labs were not back yet

## 2021-10-06 NOTE — TELEPHONE ENCOUNTER
PATIENTS WIFE IS CALLING IN SHE WANTED TO LET DOCTOR KNOW PATIENT HAS APPT WITH URINOLOGIST ON 10/21/21 WITH DOCTOR HUITRON.

## 2021-10-07 DIAGNOSIS — R97.20 ELEVATED PSA: Primary | ICD-10-CM

## 2022-09-15 ENCOUNTER — TELEPHONE (OUTPATIENT)
Dept: FAMILY MEDICINE CLINIC | Facility: CLINIC | Age: 62
End: 2022-09-15

## 2022-09-15 NOTE — TELEPHONE ENCOUNTER
Caller: Beot Jo    Relationship: Self    Best call back number:[phone] (H)    What orders are you requesting (i.e. lab or imaging): LAB WORK PRIOR TO APPT ON 10/06/2022    In what timeframe would the patient need to come in: 1 WEEK PRIOR TO APPT ON 10/06/2022    Where will you receive your lab/imaging services: IN OFFICE LAB    Additional notes: PATIENT WOULD LIKE TO HAVE THIS ORDER ENTERED PRIOR TO APPT, PLEASE CALL PATIENT BACK TO LET HIM KNOW ORDER IS ENTERED AND SCHEDULE PATIENT ACCORDINGLY FOR LAB APPT

## 2022-10-05 NOTE — PROGRESS NOTES
Subjective   Beto Jo is a 62 y.o. male.   Chief Complaint   Patient presents with   • Annual Exam   • Hypertension       History of Present Illness  The patient is here: for coordination of medical care.  Patient has: moderate activity with work/home activities    TDAP/TD VACCINES(1 - Tdap) Never done  ZOSTER VACCINE(1 of 2) Never done  HEPATITIS C SCREENING Never done  COVID-19 Vaccine(2 - Moderna series) due on 04/01/2021  INFLUENZA VACCINE Never done  LIPID PANEL due on 10/04/2022  ANNUAL PHYSICAL due on 10/05/2022  COLORECTAL CANCER SCREENING due on 10/08/2030  Pneumococcal Vaccine 0-64 Aged Out  Current pain scale 0/10.    Hypertension  This is a chronic problem. The current episode started more than 1 year ago. The problem is controlled. Pertinent negatives include no chest pain, neck pain or shortness of breath. Current antihypertension treatment includes ACE inhibitors, beta blockers and diuretics. There are no compliance problems.         The following portions of the patient's history were reviewed and updated as appropriate: allergies, current medications, past family history, past medical history, past social history, past surgical history and problem list.    Patient Active Problem List   Diagnosis   • Benign hypertension   • Mixed hyperlipidemia   • Underweight   • Colon cancer screening   • History of cardiac catheterization       Current Outpatient Medications on File Prior to Visit   Medication Sig Dispense Refill   • ADULT ASPIRIN LOW STRENGTH PO Take 1 tablet by mouth Daily.     • MULTIPLE VITAMIN PO Take  by mouth Daily.     • [DISCONTINUED] albuterol sulfate  (90 Base) MCG/ACT inhaler Inhale 2 puffs Every 6 (Six) Hours As Needed for Shortness of Air. 6.7 g 2   • [DISCONTINUED] atenolol (TENORMIN) 50 MG tablet Take 1 tablet by mouth Daily. 90 tablet 3   • [DISCONTINUED] lisinopril (PRINIVIL,ZESTRIL) 10 MG tablet Take 1 tablet by mouth Daily. 90 tablet 3   • [DISCONTINUED] simvastatin  "(ZOCOR) 20 MG tablet Take 1 tablet by mouth Daily. 90 tablet 3   • [DISCONTINUED] hydrocortisone (ANUSOL-HC) 2.5 % rectal cream Insert 1 application into the rectum.     • [DISCONTINUED] naproxen sodium (ALEVE) 220 MG tablet Take 220 mg by mouth 2 (Two) Times a Day As Needed.       No current facility-administered medications on file prior to visit.     Current outpatient and discharge medications have been reconciled for the patient.  Reviewed by: Champ Choudhary MD      No Known Allergies    Review of Systems   Constitutional: Negative for activity change, appetite change, fatigue and fever.   HENT: Negative for ear pain, swollen glands and voice change.    Eyes: Negative for visual disturbance.   Respiratory: Negative for shortness of breath and wheezing.    Cardiovascular: Negative for chest pain and leg swelling.   Gastrointestinal: Negative for abdominal pain, blood in stool, constipation, diarrhea, nausea and vomiting.   Endocrine: Negative for polydipsia and polyuria.   Genitourinary: Negative for dysuria, frequency and hematuria.   Musculoskeletal: Negative for joint swelling, neck pain and neck stiffness.   Skin: Negative for rash and wound.   Neurological: Negative for weakness, numbness and headache.   Psychiatric/Behavioral: Negative for suicidal ideas and depressed mood.     I have reviewed and confirmed the accuracy of the ROS as documented by the MA/LPN/RN Champ Choudhary MD    Objective   Visit Vitals  /78   Pulse 65   Temp 97.3 °F (36.3 °C)   Resp 18   Ht 188 cm (74\")   Wt 78.4 kg (172 lb 12.8 oz)   SpO2 99%   BMI 22.19 kg/m²       Physical Exam  Constitutional:       Appearance: He is well-developed.   HENT:      Head: Normocephalic and atraumatic.      Right Ear: External ear normal.      Left Ear: External ear normal.      Nose: Nose normal.   Eyes:      Pupils: Pupils are equal, round, and reactive to light.   Cardiovascular:      Rate and Rhythm: Normal rate and regular " rhythm.      Heart sounds: Normal heart sounds.   Pulmonary:      Effort: Pulmonary effort is normal.      Breath sounds: Normal breath sounds.   Abdominal:      General: Bowel sounds are normal.      Palpations: Abdomen is soft.   Musculoskeletal:         General: Normal range of motion.      Cervical back: Normal range of motion and neck supple.   Skin:     General: Skin is warm and dry.   Neurological:      Mental Status: He is alert and oriented to person, place, and time.   Psychiatric:         Behavior: Behavior normal.         Thought Content: Thought content normal.         Judgment: Judgment normal.       Derm Physical Exam    Diagnoses and all orders for this visit:    1. Annual physical exam (Primary)  -     CBC & Differential  -     Comprehensive Metabolic Panel  -     Lipid Panel With / Chol / HDL Ratio  -     TSH  -     PSA Screen  -     POC Urinalysis Dipstick, Multipro    2. Benign hypertension  Assessment & Plan:  Hypertension is unchanged.  Continue current treatment regimen.  Blood pressure will be reassessed at the next regular appointment.    Orders:  -     lisinopril (PRINIVIL,ZESTRIL) 10 MG tablet; Take 1 tablet by mouth Daily.  Dispense: 90 tablet; Refill: 3  -     atenolol (TENORMIN) 50 MG tablet; Take 1 tablet by mouth Daily.  Dispense: 90 tablet; Refill: 3    3. Prostate cancer screening  -     PSA Screen    4. Need for hepatitis C screening test  -     Hepatitis C Antibody    5. Mixed hyperlipidemia  Overview:  Recheck labs     Orders:  -     simvastatin (ZOCOR) 20 MG tablet; Take 1 tablet by mouth Daily.  Dispense: 90 tablet; Refill: 3    6. Benign hypertension  Comments:  restart hctz, continue lisinopril and atenolol   Assessment & Plan:  Hypertension is unchanged.  Continue current treatment regimen.  Blood pressure will be reassessed at the next regular appointment.    Orders:  -     lisinopril (PRINIVIL,ZESTRIL) 10 MG tablet; Take 1 tablet by mouth Daily.  Dispense: 90 tablet;  Refill: 3  -     atenolol (TENORMIN) 50 MG tablet; Take 1 tablet by mouth Daily.  Dispense: 90 tablet; Refill: 3    7. Seasonal allergic rhinitis due to other allergic trigger  Comments:  try claritin or zyrtec OTC  Orders:  -     albuterol sulfate  (90 Base) MCG/ACT inhaler; Inhale 2 puffs Every 6 (Six) Hours As Needed for Shortness of Air.  Dispense: 8.6 g; Refill: 2  Discussed anticipatory guidance, diet, exercise, and weight loss.  Discussed safety and routine screening examinations.  Discussed self-examinations.  BMI is within normal parameters. No other follow-up for BMI required.     Beto Jo  reports that he has never smoked. He has never used smokeless tobacco.Follow-up for routine health maintenance as indicated.       Expected course, medications, and adverse effects discussed as appropriate.  Call or return if worsening or persistent symptoms.  I wore protective equipment throughout this patient encounter to include mask and gloves. Hand hygiene was performed before donning protective equipment and after removal when leaving the room.       This document is intended for medical expert use only. Reading of this document by patients and/or patient's family without participating medical staff guidance may result in misinterpretation and unintended morbidity. Any interpretation of such data is the responsibility of the patient and/or family member responsible for the patient in concert with their primary or specialist providers, not to be left for sources of online searches such as [employer], [employer] or similar queries. Relying on these approaches to knowledge may result in misinterpretation, misguided goals of care and even death should patients or family members try recommendations outside of the realm of professional medical care.

## 2022-10-06 ENCOUNTER — OFFICE VISIT (OUTPATIENT)
Dept: FAMILY MEDICINE CLINIC | Facility: CLINIC | Age: 62
End: 2022-10-06

## 2022-10-06 VITALS
HEART RATE: 65 BPM | TEMPERATURE: 97.3 F | HEIGHT: 74 IN | DIASTOLIC BLOOD PRESSURE: 78 MMHG | OXYGEN SATURATION: 99 % | RESPIRATION RATE: 18 BRPM | WEIGHT: 172.8 LBS | SYSTOLIC BLOOD PRESSURE: 138 MMHG | BODY MASS INDEX: 22.18 KG/M2

## 2022-10-06 DIAGNOSIS — I10 BENIGN HYPERTENSION: ICD-10-CM

## 2022-10-06 DIAGNOSIS — J30.89 SEASONAL ALLERGIC RHINITIS DUE TO OTHER ALLERGIC TRIGGER: ICD-10-CM

## 2022-10-06 DIAGNOSIS — Z12.5 PROSTATE CANCER SCREENING: ICD-10-CM

## 2022-10-06 DIAGNOSIS — Z00.00 ANNUAL PHYSICAL EXAM: Primary | ICD-10-CM

## 2022-10-06 DIAGNOSIS — Z11.59 NEED FOR HEPATITIS C SCREENING TEST: ICD-10-CM

## 2022-10-06 DIAGNOSIS — E78.2 MIXED HYPERLIPIDEMIA: ICD-10-CM

## 2022-10-06 LAB
BILIRUB BLD-MCNC: NEGATIVE MG/DL
CLARITY, POC: CLEAR
COLOR UR: YELLOW
GLUCOSE UR STRIP-MCNC: NEGATIVE MG/DL
KETONES UR QL: NEGATIVE
LEUKOCYTE EST, POC: NEGATIVE
NITRITE UR-MCNC: NEGATIVE MG/ML
PH UR: 5 [PH] (ref 5–8)
PROT UR STRIP-MCNC: ABNORMAL MG/DL
RBC # UR STRIP: NEGATIVE /UL
SP GR UR: 1.02 (ref 1–1.03)
UROBILINOGEN UR QL: NORMAL

## 2022-10-06 PROCEDURE — 81003 URINALYSIS AUTO W/O SCOPE: CPT | Performed by: FAMILY MEDICINE

## 2022-10-06 PROCEDURE — 99396 PREV VISIT EST AGE 40-64: CPT | Performed by: FAMILY MEDICINE

## 2022-10-06 RX ORDER — ALBUTEROL SULFATE 90 UG/1
2 AEROSOL, METERED RESPIRATORY (INHALATION) EVERY 6 HOURS PRN
Qty: 8.6 G | Refills: 2 | Status: SHIPPED | OUTPATIENT
Start: 2022-10-06

## 2022-10-06 RX ORDER — SIMVASTATIN 20 MG
20 TABLET ORAL DAILY
Qty: 90 TABLET | Refills: 3 | Status: SHIPPED | OUTPATIENT
Start: 2022-10-06

## 2022-10-06 RX ORDER — LISINOPRIL 10 MG/1
10 TABLET ORAL DAILY
Qty: 90 TABLET | Refills: 3 | Status: SHIPPED | OUTPATIENT
Start: 2022-10-06

## 2022-10-06 RX ORDER — ATENOLOL 50 MG/1
50 TABLET ORAL DAILY
Qty: 90 TABLET | Refills: 3 | Status: SHIPPED | OUTPATIENT
Start: 2022-10-06

## 2022-10-09 LAB
ALBUMIN SERPL-MCNC: 4.4 G/DL (ref 3.8–4.8)
ALBUMIN/GLOB SERPL: 2 {RATIO} (ref 1.2–2.2)
ALP SERPL-CCNC: 62 IU/L (ref 44–121)
ALT SERPL-CCNC: 18 IU/L (ref 0–44)
AST SERPL-CCNC: 21 IU/L (ref 0–40)
BASOPHILS # BLD AUTO: 0.1 X10E3/UL (ref 0–0.2)
BASOPHILS NFR BLD AUTO: 1 %
BILIRUB SERPL-MCNC: 0.6 MG/DL (ref 0–1.2)
BUN SERPL-MCNC: 16 MG/DL (ref 8–27)
BUN/CREAT SERPL: 14 (ref 10–24)
CALCIUM SERPL-MCNC: 9.3 MG/DL (ref 8.6–10.2)
CHLORIDE SERPL-SCNC: 102 MMOL/L (ref 96–106)
CHOLEST SERPL-MCNC: 156 MG/DL (ref 100–199)
CHOLEST/HDLC SERPL: 4.6 RATIO (ref 0–5)
CO2 SERPL-SCNC: 23 MMOL/L (ref 20–29)
CREAT SERPL-MCNC: 1.12 MG/DL (ref 0.76–1.27)
EGFRCR SERPLBLD CKD-EPI 2021: 75 ML/MIN/1.73
EOSINOPHIL # BLD AUTO: 0.2 X10E3/UL (ref 0–0.4)
EOSINOPHIL NFR BLD AUTO: 6 %
ERYTHROCYTE [DISTWIDTH] IN BLOOD BY AUTOMATED COUNT: 12.5 % (ref 11.6–15.4)
GLOBULIN SER CALC-MCNC: 2.2 G/DL (ref 1.5–4.5)
GLUCOSE SERPL-MCNC: 103 MG/DL (ref 70–99)
HCT VFR BLD AUTO: 46.2 % (ref 37.5–51)
HCV AB S/CO SERPL IA: <0.1 S/CO RATIO (ref 0–0.9)
HDLC SERPL-MCNC: 34 MG/DL
HGB BLD-MCNC: 15.5 G/DL (ref 13–17.7)
IMM GRANULOCYTES # BLD AUTO: 0 X10E3/UL (ref 0–0.1)
IMM GRANULOCYTES NFR BLD AUTO: 0 %
LDLC SERPL CALC-MCNC: 97 MG/DL (ref 0–99)
LYMPHOCYTES # BLD AUTO: 1 X10E3/UL (ref 0.7–3.1)
LYMPHOCYTES NFR BLD AUTO: 24 %
MCH RBC QN AUTO: 30 PG (ref 26.6–33)
MCHC RBC AUTO-ENTMCNC: 33.5 G/DL (ref 31.5–35.7)
MCV RBC AUTO: 90 FL (ref 79–97)
MONOCYTES # BLD AUTO: 0.5 X10E3/UL (ref 0.1–0.9)
MONOCYTES NFR BLD AUTO: 12 %
NEUTROPHILS # BLD AUTO: 2.3 X10E3/UL (ref 1.4–7)
NEUTROPHILS NFR BLD AUTO: 57 %
PLATELET # BLD AUTO: 213 X10E3/UL (ref 150–450)
POTASSIUM SERPL-SCNC: 4.7 MMOL/L (ref 3.5–5.2)
PROT SERPL-MCNC: 6.6 G/DL (ref 6–8.5)
PSA SERPL-MCNC: 3.3 NG/ML (ref 0–4)
RBC # BLD AUTO: 5.16 X10E6/UL (ref 4.14–5.8)
SODIUM SERPL-SCNC: 140 MMOL/L (ref 134–144)
TRIGL SERPL-MCNC: 139 MG/DL (ref 0–149)
TSH SERPL DL<=0.005 MIU/L-ACNC: 0.82 UIU/ML (ref 0.45–4.5)
VLDLC SERPL CALC-MCNC: 25 MG/DL (ref 5–40)
WBC # BLD AUTO: 4.1 X10E3/UL (ref 3.4–10.8)

## 2023-09-17 DIAGNOSIS — E78.2 MIXED HYPERLIPIDEMIA: ICD-10-CM

## 2023-09-17 RX ORDER — SIMVASTATIN 20 MG
20 TABLET ORAL DAILY
Qty: 90 TABLET | Refills: 3 | Status: CANCELLED | OUTPATIENT
Start: 2023-09-17

## 2023-09-18 DIAGNOSIS — E78.2 MIXED HYPERLIPIDEMIA: ICD-10-CM

## 2023-09-19 RX ORDER — SIMVASTATIN 20 MG
20 TABLET ORAL DAILY
Qty: 30 TABLET | Refills: 0 | Status: SHIPPED | OUTPATIENT
Start: 2023-09-19

## 2023-10-24 DIAGNOSIS — E78.2 MIXED HYPERLIPIDEMIA: ICD-10-CM

## 2023-10-24 RX ORDER — SIMVASTATIN 20 MG
20 TABLET ORAL DAILY
Qty: 30 TABLET | Refills: 0 | OUTPATIENT
Start: 2023-10-24

## 2023-10-31 DIAGNOSIS — E78.2 MIXED HYPERLIPIDEMIA: ICD-10-CM

## 2023-10-31 RX ORDER — SIMVASTATIN 20 MG
20 TABLET ORAL DAILY
Qty: 30 TABLET | Refills: 0 | Status: SHIPPED | OUTPATIENT
Start: 2023-10-31

## 2023-10-31 NOTE — TELEPHONE ENCOUNTER
Caller: Beto Jo LILLY    Relationship: Self    Best call back number: [phone]     Requested Prescriptions:   Requested Prescriptions     Pending Prescriptions Disp Refills    simvastatin (ZOCOR) 20 MG tablet 30 tablet 0     Sig: Take 1 tablet by mouth Daily. ONE MONTH ONLY-PATIENT DUE FOR APPOINTMENT        Pharmacy where request should be sent: [facility]     Last office visit with prescribing clinician: 10/6/2022   Last telemedicine visit with prescribing clinician: Visit date not found   Next office visit with prescribing clinician: 11/7/2023     Additional details provided by patient: PATIENT ONLY HAS 2 TABLETS REMAINING, REQUESTING ENOUGH TO LAST UNTIL UPCOMING APPOINTMENT.    Does the patient have less than a 3 day supply:  [x] Yes  [] No    Would you like a call back once the refill request has been completed: [x] Yes [] No    If the office needs to give you a call back, can they leave a voicemail: [x] Yes [] No    Gio Cantor Rep   10/31/23 10:50 EDT

## 2023-10-31 NOTE — TELEPHONE ENCOUNTER
Caller: Beto Jo    Relationship: Self    Best call back number: [phone] (Mobile)       What was the call regarding: PATIENT HAS 2 DAYS LEFT OF THIS MEDICATION AND WANTED TO KNOW IF YOU COULD CALL IN PLEASE    HAS APPT ON THE BOOKS     Is it okay if the provider responds through MyChart: CALL IF NEEDED

## 2023-11-07 ENCOUNTER — OFFICE VISIT (OUTPATIENT)
Dept: FAMILY MEDICINE CLINIC | Facility: CLINIC | Age: 63
End: 2023-11-07
Payer: COMMERCIAL

## 2023-11-07 VITALS
SYSTOLIC BLOOD PRESSURE: 138 MMHG | TEMPERATURE: 97.5 F | DIASTOLIC BLOOD PRESSURE: 82 MMHG | HEART RATE: 68 BPM | BODY MASS INDEX: 22.07 KG/M2 | WEIGHT: 172 LBS | OXYGEN SATURATION: 99 % | HEIGHT: 74 IN | RESPIRATION RATE: 18 BRPM

## 2023-11-07 DIAGNOSIS — I10 BENIGN HYPERTENSION: ICD-10-CM

## 2023-11-07 DIAGNOSIS — J30.89 SEASONAL ALLERGIC RHINITIS DUE TO OTHER ALLERGIC TRIGGER: ICD-10-CM

## 2023-11-07 DIAGNOSIS — E78.2 MIXED HYPERLIPIDEMIA: ICD-10-CM

## 2023-11-07 DIAGNOSIS — Z00.00 ENCOUNTER FOR WELLNESS EXAMINATION: Primary | ICD-10-CM

## 2023-11-07 DIAGNOSIS — Z12.5 PROSTATE CANCER SCREENING: ICD-10-CM

## 2023-11-07 PROCEDURE — 81002 URINALYSIS NONAUTO W/O SCOPE: CPT | Performed by: FAMILY MEDICINE

## 2023-11-07 PROCEDURE — 99396 PREV VISIT EST AGE 40-64: CPT | Performed by: FAMILY MEDICINE

## 2023-11-07 RX ORDER — MULTIVITAMIN WITH IRON
1 TABLET ORAL DAILY
COMMUNITY

## 2023-11-07 RX ORDER — ATENOLOL 50 MG/1
50 TABLET ORAL DAILY
Qty: 90 TABLET | Refills: 3 | Status: SHIPPED | OUTPATIENT
Start: 2023-11-07

## 2023-11-07 RX ORDER — ALBUTEROL SULFATE 90 UG/1
2 AEROSOL, METERED RESPIRATORY (INHALATION) EVERY 6 HOURS PRN
Qty: 8.6 G | Refills: 2 | Status: SHIPPED | OUTPATIENT
Start: 2023-11-07

## 2023-11-07 RX ORDER — LISINOPRIL 10 MG/1
10 TABLET ORAL DAILY
Qty: 90 TABLET | Refills: 3 | Status: SHIPPED | OUTPATIENT
Start: 2023-11-07

## 2023-11-07 RX ORDER — SIMVASTATIN 20 MG
20 TABLET ORAL DAILY
Qty: 90 TABLET | Refills: 3 | Status: SHIPPED | OUTPATIENT
Start: 2023-11-07

## 2023-11-07 NOTE — PROGRESS NOTES
Subjective   Beto Jo is a 63 y.o. male.   Chief Complaint   Patient presents with    Annual Exam       History of Present Illness  The patient is here: for coordination of medical care.  Patient has: vigorous activity with work/home activities    TDAP/TD VACCINES(1 - Tdap) Never done  ZOSTER VACCINE(1 of 2) Never done  INFLUENZA VACCINE Never done  COVID-19 Vaccine(4 - 2023-24 season) due on 09/01/2023  ANNUAL PHYSICAL due on 10/06/2023  LIPID PANEL due on 10/08/2023  COLORECTAL CANCER SCREENING due on 10/08/2030  HEPATITIS C SCREENING Completed  Pneumococcal Vaccine 0-64 Aged Out  Current pain scale 0/10.        The following portions of the patient's history were reviewed and updated as appropriate: allergies, current medications, past family history, past medical history, past social history, past surgical history, and problem list.    Patient Active Problem List   Diagnosis    Benign hypertension    Mixed hyperlipidemia    Underweight    Colon cancer screening    History of cardiac catheterization       Current Outpatient Medications on File Prior to Visit   Medication Sig Dispense Refill    ADULT ASPIRIN LOW STRENGTH PO Take 1 tablet by mouth Daily.      amoxicillin (AMOXIL) 500 MG capsule Take 1 capsule by mouth 3 times a day. 21 capsule 0    Glucos-Chondroit-Hyaluron-MSM (GLUCOSAMINE CHONDROITIN JOINT PO) Take 1 tablet by mouth 2 (Two) Times a Day. 150mg in am and 1200mg in pm      Magnesium 250 MG tablet Take 1 tablet by mouth Daily.      MULTIPLE VITAMIN PO Take  by mouth Daily.      [DISCONTINUED] atenolol (TENORMIN) 50 MG tablet Take 1 tablet by mouth Daily. 90 tablet 3    [DISCONTINUED] lisinopril (PRINIVIL,ZESTRIL) 10 MG tablet Take 1 tablet by mouth Daily. 90 tablet 3    [DISCONTINUED] simvastatin (ZOCOR) 20 MG tablet Take 1 tablet by mouth Daily. 30 tablet 0    [DISCONTINUED] albuterol sulfate  (90 Base) MCG/ACT inhaler Inhale 2 puffs Every 6 (Six) Hours As Needed for Shortness of Air.  "(Patient not taking: Reported on 11/7/2023) 8.6 g 2    [DISCONTINUED] HYDROcodone-acetaminophen (NORCO) 5-325 MG per tablet Take 1 tablet by mouth every 4-6 hours as needed (Patient not taking: Reported on 11/7/2023) 20 tablet 0     No current facility-administered medications on file prior to visit.     Current outpatient and discharge medications have been reconciled for the patient.  Reviewed by: Champ Choudhary MD      No Known Allergies    Review of Systems   Constitutional:  Negative for activity change, appetite change, fatigue and fever.   HENT:  Negative for ear pain, swollen glands and voice change.    Eyes:  Negative for visual disturbance.   Respiratory:  Negative for shortness of breath and wheezing.    Cardiovascular:  Negative for chest pain and leg swelling.   Gastrointestinal:  Negative for abdominal pain, blood in stool, constipation, diarrhea, nausea and vomiting.   Endocrine: Negative for polydipsia and polyuria.   Genitourinary:  Negative for dysuria, frequency and hematuria.   Musculoskeletal:  Negative for joint swelling, neck pain and neck stiffness.   Skin:  Negative for rash and wound.   Neurological:  Negative for weakness, numbness and headache.   Psychiatric/Behavioral:  Negative for suicidal ideas and depressed mood.      I have reviewed and confirmed the accuracy of the ROS as documented by the MA/LPN/RN Champ Choudhary MD    Objective   Visit Vitals  /82 (BP Location: Left arm, Patient Position: Sitting, Cuff Size: Adult)   Pulse 68   Temp 97.5 °F (36.4 °C) (Temporal)   Resp 18   Ht 188 cm (74\")   Wt 78 kg (172 lb)   SpO2 99%   BMI 22.08 kg/m²      **  Physical Exam  Constitutional:       Appearance: He is well-developed.   HENT:      Head: Normocephalic and atraumatic.      Right Ear: External ear normal.      Left Ear: External ear normal.      Nose: Nose normal.   Eyes:      Pupils: Pupils are equal, round, and reactive to light.   Cardiovascular:      Rate and " Rhythm: Normal rate and regular rhythm.      Heart sounds: Normal heart sounds.   Pulmonary:      Effort: Pulmonary effort is normal.      Breath sounds: Normal breath sounds.   Abdominal:      General: Bowel sounds are normal.      Palpations: Abdomen is soft.   Musculoskeletal:         General: Normal range of motion.      Cervical back: Normal range of motion and neck supple.   Skin:     General: Skin is warm and dry.   Neurological:      Mental Status: He is alert and oriented to person, place, and time.   Psychiatric:         Behavior: Behavior normal.         Thought Content: Thought content normal.         Judgment: Judgment normal.       Derm Physical Exam  Ortho Exam   Neurologic Exam     Mental Status   Oriented to person, place, and time.     Cranial Nerves     CN III, IV, VI   Pupils are equal, round, and reactive to light.       Diagnoses and all orders for this visit:    1. Encounter for wellness examination (Primary)  -     POCT urinalysis dipstick, manual  -     CBC & Differential  -     Comprehensive Metabolic Panel  -     Lipid Panel With / Chol / HDL Ratio  -     TSH  -     PSA Screen    2. Prostate cancer screening  -     PSA Screen    3. Seasonal allergic rhinitis due to other allergic trigger  Comments:  try claritin or zyrtec OTC  Orders:  -     albuterol sulfate  (90 Base) MCG/ACT inhaler; Inhale 2 puffs Every 6 (Six) Hours As Needed for Shortness of Air.  Dispense: 8.6 g; Refill: 2    4. Benign hypertension  -     atenolol (TENORMIN) 50 MG tablet; Take 1 tablet by mouth Daily.  Dispense: 90 tablet; Refill: 3  -     lisinopril (PRINIVIL,ZESTRIL) 10 MG tablet; Take 1 tablet by mouth Daily.  Dispense: 90 tablet; Refill: 3    5. Benign hypertension  Comments:  restart hctz, continue lisinopril and atenolol   Orders:  -     atenolol (TENORMIN) 50 MG tablet; Take 1 tablet by mouth Daily.  Dispense: 90 tablet; Refill: 3  -     lisinopril (PRINIVIL,ZESTRIL) 10 MG tablet; Take 1 tablet by  mouth Daily.  Dispense: 90 tablet; Refill: 3    6. Mixed hyperlipidemia  Overview:  Recheck labs     Orders:  -     simvastatin (ZOCOR) 20 MG tablet; Take 1 tablet by mouth Daily.  Dispense: 90 tablet; Refill: 3     Discussed anticipatory guidance, diet, exercise, and weight loss.  Discussed safety and routine screening examinations.  Discussed self-examinations.  Beto Jo  reports that he has never smoked. He has never used smokeless tobacco..   Follow-up for routine health maintenance as indicated.    BMI is within normal parameters. No other follow-up for BMI required.    Expected course, medications, and adverse effects discussed as appropriate.  Call or return if worsening or persistent symptoms.     This document is intended for medical professional use only.

## 2023-11-09 ENCOUNTER — TELEPHONE (OUTPATIENT)
Dept: FAMILY MEDICINE CLINIC | Facility: CLINIC | Age: 63
End: 2023-11-09
Payer: COMMERCIAL

## 2023-11-09 DIAGNOSIS — Z12.11 COLON CANCER SCREENING: Primary | ICD-10-CM

## 2023-11-09 NOTE — TELEPHONE ENCOUNTER
DELETE AFTER REVIEWING: Telephone encounter to be sent to the clinical pool     Caller: Beto Jo    Relationship: Self    Best call back number: [phone]    What orders are you requesting (i.e. lab or imaging): COLOGARD     In what timeframe would the patient need to come in: ASAP    Where will you receive your lab/imaging services: OFFICE     Additional notes: PATIENT CALLED AND SAID HE RECEIVED NOTICE IN THE MAIL IT WAS TIME FOR ANOTHER COLOGARD, AND HE WOULD LIKE DR. PAREKH TO ORDER ONE FOR HIM

## 2023-11-12 LAB
ALBUMIN SERPL-MCNC: 4.4 G/DL (ref 3.9–4.9)
ALBUMIN/GLOB SERPL: 1.8 {RATIO} (ref 1.2–2.2)
ALP SERPL-CCNC: 64 IU/L (ref 44–121)
ALT SERPL-CCNC: 18 IU/L (ref 0–44)
AST SERPL-CCNC: 22 IU/L (ref 0–40)
BASOPHILS # BLD AUTO: 0.1 X10E3/UL (ref 0–0.2)
BASOPHILS NFR BLD AUTO: 1 %
BILIRUB SERPL-MCNC: 0.5 MG/DL (ref 0–1.2)
BUN SERPL-MCNC: 17 MG/DL (ref 8–27)
BUN/CREAT SERPL: 15 (ref 10–24)
CALCIUM SERPL-MCNC: 9.2 MG/DL (ref 8.6–10.2)
CHLORIDE SERPL-SCNC: 104 MMOL/L (ref 96–106)
CHOLEST SERPL-MCNC: 148 MG/DL (ref 100–199)
CHOLEST/HDLC SERPL: 4 RATIO (ref 0–5)
CO2 SERPL-SCNC: 25 MMOL/L (ref 20–29)
CREAT SERPL-MCNC: 1.16 MG/DL (ref 0.76–1.27)
EGFRCR SERPLBLD CKD-EPI 2021: 71 ML/MIN/1.73
EOSINOPHIL # BLD AUTO: 0.2 X10E3/UL (ref 0–0.4)
EOSINOPHIL NFR BLD AUTO: 6 %
ERYTHROCYTE [DISTWIDTH] IN BLOOD BY AUTOMATED COUNT: 12.6 % (ref 11.6–15.4)
GLOBULIN SER CALC-MCNC: 2.5 G/DL (ref 1.5–4.5)
GLUCOSE SERPL-MCNC: 103 MG/DL (ref 70–99)
HCT VFR BLD AUTO: 44.8 % (ref 37.5–51)
HDLC SERPL-MCNC: 37 MG/DL
HGB BLD-MCNC: 15.4 G/DL (ref 13–17.7)
IMM GRANULOCYTES # BLD AUTO: 0 X10E3/UL (ref 0–0.1)
IMM GRANULOCYTES NFR BLD AUTO: 0 %
LDLC SERPL CALC-MCNC: 94 MG/DL (ref 0–99)
LYMPHOCYTES # BLD AUTO: 1.1 X10E3/UL (ref 0.7–3.1)
LYMPHOCYTES NFR BLD AUTO: 31 %
MCH RBC QN AUTO: 29.7 PG (ref 26.6–33)
MCHC RBC AUTO-ENTMCNC: 34.4 G/DL (ref 31.5–35.7)
MCV RBC AUTO: 87 FL (ref 79–97)
MONOCYTES # BLD AUTO: 0.4 X10E3/UL (ref 0.1–0.9)
MONOCYTES NFR BLD AUTO: 11 %
NEUTROPHILS # BLD AUTO: 1.9 X10E3/UL (ref 1.4–7)
NEUTROPHILS NFR BLD AUTO: 51 %
PLATELET # BLD AUTO: 237 X10E3/UL (ref 150–450)
POTASSIUM SERPL-SCNC: 4.5 MMOL/L (ref 3.5–5.2)
PROT SERPL-MCNC: 6.9 G/DL (ref 6–8.5)
PSA SERPL-MCNC: 3.5 NG/ML (ref 0–4)
RBC # BLD AUTO: 5.18 X10E6/UL (ref 4.14–5.8)
SODIUM SERPL-SCNC: 140 MMOL/L (ref 134–144)
TRIGL SERPL-MCNC: 90 MG/DL (ref 0–149)
TSH SERPL DL<=0.005 MIU/L-ACNC: 1.31 UIU/ML (ref 0.45–4.5)
VLDLC SERPL CALC-MCNC: 17 MG/DL (ref 5–40)
WBC # BLD AUTO: 3.7 X10E3/UL (ref 3.4–10.8)

## 2023-11-13 ENCOUNTER — PATIENT MESSAGE (OUTPATIENT)
Dept: FAMILY MEDICINE CLINIC | Facility: CLINIC | Age: 63
End: 2023-11-13
Payer: COMMERCIAL

## 2024-09-30 ENCOUNTER — OFFICE VISIT (OUTPATIENT)
Dept: FAMILY MEDICINE CLINIC | Facility: CLINIC | Age: 64
End: 2024-09-30
Payer: COMMERCIAL

## 2024-09-30 VITALS
BODY MASS INDEX: 21.56 KG/M2 | HEIGHT: 74 IN | RESPIRATION RATE: 20 BRPM | WEIGHT: 168 LBS | TEMPERATURE: 100.5 F | DIASTOLIC BLOOD PRESSURE: 72 MMHG | HEART RATE: 86 BPM | SYSTOLIC BLOOD PRESSURE: 122 MMHG

## 2024-09-30 DIAGNOSIS — J02.9 PHARYNGITIS, UNSPECIFIED ETIOLOGY: ICD-10-CM

## 2024-09-30 DIAGNOSIS — R50.9 FEVER, UNSPECIFIED FEVER CAUSE: Primary | ICD-10-CM

## 2024-09-30 LAB
EXPIRATION DATE: NORMAL
EXPIRATION DATE: NORMAL
FLUAV AG UPPER RESP QL IA.RAPID: NOT DETECTED
FLUBV AG UPPER RESP QL IA.RAPID: NOT DETECTED
INTERNAL CONTROL: NORMAL
INTERNAL CONTROL: NORMAL
Lab: NORMAL
Lab: NORMAL
S PYO AG THROAT QL: NEGATIVE
SARS-COV-2 AG UPPER RESP QL IA.RAPID: NOT DETECTED

## 2024-09-30 PROCEDURE — 99213 OFFICE O/P EST LOW 20 MIN: CPT | Performed by: FAMILY MEDICINE

## 2024-09-30 PROCEDURE — 87880 STREP A ASSAY W/OPTIC: CPT | Performed by: FAMILY MEDICINE

## 2024-09-30 PROCEDURE — 87428 SARSCOV & INF VIR A&B AG IA: CPT | Performed by: FAMILY MEDICINE

## 2024-09-30 RX ORDER — AZITHROMYCIN 250 MG/1
TABLET, FILM COATED ORAL
Qty: 6 TABLET | Refills: 0 | Status: SHIPPED | OUTPATIENT
Start: 2024-09-30

## 2024-09-30 NOTE — PROGRESS NOTES
PRE-SEDATION ASSESSMENT    CONSENT  Consent for procedure and sedation obtained: Yes    MEDICAL HISTORY  Significant medical/surgical history: No  Past Complications with Sedation/Anesthesia: No  Significant Family History: No  Smoking History: No  Alcohol/Drug abuse: No  Possible Pregnancy (LMP): No  Cardiac History: Yes  Respiratory History: No    PHYSICAL EXAM  History and Physical Reviewed: Patient has valid H&P within 30 days. I have reviewed and there are no changes.  Airway Risk History: No previous complications  Airway Anatomy : Class I  Heart : Normal  Lungs : Normal  LOC/Mental Status : Normal    OTHER FINDINGS  Reviewed current medications and allergies: Yes  Pertinent lab/diagnostic test reviewed: Yes    SEDATION RISK ASSESSMENT  Risk Status ASA: Class I - Normal, healthy patient  Plan for Sedation: Moderate Sedation  Indications for Procedure/Pre-Procedure Diagnosis and Planned Procedure: UA, Abn stress test  EKG Monitoring: Yes    NARRATIVE FINDINGS      Subjective   Beto Jo is a 64 y.o. male.   Chief Complaint   Patient presents with    URI       History of Present Illness  Sore throat, temp to 102  URI   This is a new problem. The current episode started yesterday. The problem has been unchanged. The maximum temperature recorded prior to his arrival was 101 - 101.9 F. The fever has been present for 1 to 2 days. Associated symptoms include congestion, headaches and a sore throat. He has tried acetaminophen and NSAIDs for the symptoms. The treatment provided no relief.        The following portions of the patient's history were reviewed and updated as appropriate: allergies, current medications, past family history, past medical history, past social history, past surgical history, and problem list.    Patient Active Problem List   Diagnosis    Benign hypertension    Mixed hyperlipidemia    Underweight    Colon cancer screening    History of cardiac catheterization       Current Outpatient Medications on File Prior to Visit   Medication Sig Dispense Refill    ADULT ASPIRIN LOW STRENGTH PO Take 1 tablet by mouth Daily.      albuterol sulfate  (90 Base) MCG/ACT inhaler Inhale 2 puffs Every 6 (Six) Hours As Needed for Shortness of Air. 8.6 g 2    atenolol (TENORMIN) 50 MG tablet Take 1 tablet by mouth Daily. 90 tablet 3    Glucos-Chondroit-Hyaluron-MSM (GLUCOSAMINE CHONDROITIN JOINT PO) Take 1 tablet by mouth 2 (Two) Times a Day. 150mg in am and 1200mg in pm      lisinopril (PRINIVIL,ZESTRIL) 10 MG tablet Take 1 tablet by mouth Daily. 90 tablet 3    Magnesium 250 MG tablet Take 1 tablet by mouth Daily.      MULTIPLE VITAMIN PO Take  by mouth Daily.      simvastatin (ZOCOR) 20 MG tablet Take 1 tablet by mouth Daily. 90 tablet 3    [DISCONTINUED] amoxicillin (AMOXIL) 500 MG capsule Take 1 capsule by mouth 3 times a day. 21 capsule 0     No current facility-administered medications on file prior to visit.     Current outpatient and discharge medications have  "been reconciled for the patient.  Reviewed by: Champ Choudhary MD      No Known Allergies    Review of Systems   Constitutional:  Positive for fatigue and fever.   HENT:  Positive for congestion and sore throat.    All other systems reviewed and are negative.    I have reviewed and confirmed the accuracy of the ROS as documented by the MA/LPN/RN Champ Choudhary MD    Objective   Visit Vitals  /72 (BP Location: Right arm, Patient Position: Sitting, Cuff Size: Adult)   Pulse 86   Temp 100.5 °F (38.1 °C) (Temporal)   Resp 20   Ht 188 cm (74.02\")   Wt 76.2 kg (168 lb)   BMI 21.56 kg/m²      **  Physical Exam  Constitutional:       Appearance: He is well-developed.   HENT:      Head: Normocephalic and atraumatic.      Right Ear: External ear normal.      Left Ear: External ear normal.      Nose: Nose normal.      Mouth/Throat:      Comments: Mils posterior erythema   Eyes:      Pupils: Pupils are equal, round, and reactive to light.   Cardiovascular:      Rate and Rhythm: Normal rate and regular rhythm.      Heart sounds: Normal heart sounds.   Pulmonary:      Effort: Pulmonary effort is normal.      Breath sounds: Normal breath sounds.   Abdominal:      General: Bowel sounds are normal.      Palpations: Abdomen is soft.   Musculoskeletal:         General: Normal range of motion.      Cervical back: Normal range of motion and neck supple.   Skin:     General: Skin is warm and dry.   Neurological:      Mental Status: He is alert and oriented to person, place, and time.   Psychiatric:         Behavior: Behavior normal.         Thought Content: Thought content normal.         Judgment: Judgment normal.       Derm Physical Exam  Ortho Exam   Neurologic Exam     Mental Status   Oriented to person, place, and time.     Cranial Nerves     CN III, IV, VI   Pupils are equal, round, and reactive to light.         Diagnoses and all orders for this visit:    1. Fever, unspecified fever cause (Primary)  -     POCT " SARS-CoV-2 + Flu Antigen TOMAS  -     POC Rapid Strep A  -     azithromycin (ZITHROMAX) 250 MG tablet; Take 2 tablets the first day, then 1 tablet daily for 4 days.  Dispense: 6 tablet; Refill: 0    2. Pharyngitis, unspecified etiology  -     azithromycin (ZITHROMAX) 250 MG tablet; Take 2 tablets the first day, then 1 tablet daily for 4 days.  Dispense: 6 tablet; Refill: 0     Findings discussed. All questions answered.  Medication and medication adverse effects discussed.  Drug education given and explained to patient. Patient verbalized understanding.  Differential diagnosis discussed.   Follow-up for routine health maintenance as indicated.  Follow-up in approximately 3 days for reevaluation if not improved.  Follow-up sooner for worsening symptoms or any concerns.   BMI is within normal parameters. No other follow-up for BMI required.      Expected course, medications, and adverse effects discussed as appropriate.  Call or return if worsening or persistent symptoms.     This document is intended for medical professional use only.   Electronically signed by Champ Choudhary MD on 09/30/2024. This content may not have been proofread.

## 2024-11-08 ENCOUNTER — OFFICE VISIT (OUTPATIENT)
Dept: FAMILY MEDICINE CLINIC | Facility: CLINIC | Age: 64
End: 2024-11-08
Payer: COMMERCIAL

## 2024-11-08 VITALS
OXYGEN SATURATION: 100 % | RESPIRATION RATE: 18 BRPM | WEIGHT: 167 LBS | SYSTOLIC BLOOD PRESSURE: 142 MMHG | TEMPERATURE: 97.3 F | HEIGHT: 74 IN | BODY MASS INDEX: 21.43 KG/M2 | DIASTOLIC BLOOD PRESSURE: 70 MMHG | HEART RATE: 97 BPM

## 2024-11-08 DIAGNOSIS — Z87.898 HISTORY OF ELEVATED PSA: ICD-10-CM

## 2024-11-08 DIAGNOSIS — Z00.00 ENCOUNTER FOR WELLNESS EXAMINATION: Primary | ICD-10-CM

## 2024-11-08 DIAGNOSIS — E78.2 MIXED HYPERLIPIDEMIA: ICD-10-CM

## 2024-11-08 DIAGNOSIS — I10 BENIGN HYPERTENSION: ICD-10-CM

## 2024-11-08 PROCEDURE — 81002 URINALYSIS NONAUTO W/O SCOPE: CPT | Performed by: FAMILY MEDICINE

## 2024-11-08 PROCEDURE — 99396 PREV VISIT EST AGE 40-64: CPT | Performed by: FAMILY MEDICINE

## 2024-11-08 RX ORDER — SIMVASTATIN 20 MG
20 TABLET ORAL DAILY
Qty: 90 TABLET | Refills: 3 | Status: SHIPPED | OUTPATIENT
Start: 2024-11-08

## 2024-11-08 RX ORDER — ATENOLOL 50 MG/1
50 TABLET ORAL DAILY
Qty: 90 TABLET | Refills: 3 | Status: SHIPPED | OUTPATIENT
Start: 2024-11-08

## 2024-11-08 RX ORDER — LISINOPRIL 10 MG/1
10 TABLET ORAL DAILY
Qty: 90 TABLET | Refills: 3 | Status: SHIPPED | OUTPATIENT
Start: 2024-11-08

## 2024-11-08 NOTE — ASSESSMENT & PLAN NOTE
Stable, continue current management     Orders:    atenolol (TENORMIN) 50 MG tablet; Take 1 tablet by mouth Daily.    lisinopril (PRINIVIL,ZESTRIL) 10 MG tablet; Take 1 tablet by mouth Daily.    CBC & Differential    Comprehensive Metabolic Panel

## 2024-11-08 NOTE — PROGRESS NOTES
Subjective   Beto Jo is a 64 y.o. male.   Chief Complaint   Patient presents with    Annual Exam       History of Present Illness  The patient is here: for coordination of medical care.  Patient has: moderate activity with work/home activities    TDAP/TD VACCINES(1 - Tdap) Never done  ZOSTER VACCINE(1 of 2) Never done  INFLUENZA VACCINE declined  COVID-19 Vaccine(4 - 2024-25 season) due on 09/01/2024  ANNUAL PHYSICAL due on 11/07/2024  LIPID PANEL due on 11/11/2024  COLORECTAL CANCER SCREENING due on 10/08/2030  HEPATITIS C SCREENING Completed  Pneumococcal Vaccine 0-64 Aged Out  Current pain scale 0/10.          The following portions of the patient's history were reviewed and updated as appropriate: allergies, current medications, past family history, past medical history, past social history, past surgical history, and problem list.    Patient Active Problem List   Diagnosis    Benign hypertension    Mixed hyperlipidemia    Underweight    Colon cancer screening    History of cardiac catheterization       Current Outpatient Medications on File Prior to Visit   Medication Sig Dispense Refill    ADULT ASPIRIN LOW STRENGTH PO Take 1 tablet by mouth Daily.      albuterol sulfate  (90 Base) MCG/ACT inhaler Inhale 2 puffs Every 6 (Six) Hours As Needed for Shortness of Air. 8.6 g 2    Glucos-Chondroit-Hyaluron-MSM (GLUCOSAMINE CHONDROITIN JOINT PO) Take 1 tablet by mouth 2 (Two) Times a Day. 150mg in am and 1200mg in pm      Magnesium 250 MG tablet Take 1 tablet by mouth Daily.      MULTIPLE VITAMIN PO Take  by mouth Daily.      [DISCONTINUED] atenolol (TENORMIN) 50 MG tablet Take 1 tablet by mouth Daily. 90 tablet 3    [DISCONTINUED] lisinopril (PRINIVIL,ZESTRIL) 10 MG tablet Take 1 tablet by mouth Daily. 90 tablet 3    [DISCONTINUED] simvastatin (ZOCOR) 20 MG tablet Take 1 tablet by mouth Daily. 90 tablet 3    [DISCONTINUED] azithromycin (ZITHROMAX) 250 MG tablet Take 2 tablets the first day, then 1 tablet  "daily for 4 days. (Patient not taking: Reported on 11/8/2024) 6 tablet 0     No current facility-administered medications on file prior to visit.     Current outpatient and discharge medications have been reconciled for the patient.  Reviewed by: Champ Choudhary MD      Not on File    Review of Systems   Constitutional:  Negative for activity change, appetite change, fatigue and fever.   HENT:  Negative for ear pain, swollen glands and voice change.    Eyes:  Negative for visual disturbance.   Respiratory:  Negative for shortness of breath and wheezing.    Cardiovascular:  Negative for chest pain and leg swelling.   Gastrointestinal:  Negative for abdominal pain, blood in stool, constipation, diarrhea, nausea and vomiting.   Endocrine: Negative for polydipsia and polyuria.   Genitourinary:  Negative for dysuria, frequency and hematuria.   Musculoskeletal:  Negative for joint swelling, neck pain and neck stiffness.   Skin:  Negative for rash and wound.   Neurological:  Negative for weakness, numbness and headache.   Psychiatric/Behavioral:  Negative for suicidal ideas and depressed mood.      I have reviewed and confirmed the accuracy of the ROS as documented by the MA/LPN/RN Champ Choudhary MD    Objective   Visit Vitals  /70 (BP Location: Right arm, Patient Position: Sitting, Cuff Size: Adult)   Pulse 97   Temp 97.3 °F (36.3 °C) (Temporal)   Resp 18   Ht 188 cm (74\")   Wt 75.8 kg (167 lb)   SpO2 100%   BMI 21.44 kg/m²      **  Physical Exam  Constitutional:       Appearance: He is well-developed.   HENT:      Head: Normocephalic and atraumatic.      Right Ear: External ear normal.      Left Ear: External ear normal.      Nose: Nose normal.   Eyes:      Pupils: Pupils are equal, round, and reactive to light.   Cardiovascular:      Rate and Rhythm: Normal rate and regular rhythm.      Heart sounds: Normal heart sounds.   Pulmonary:      Effort: Pulmonary effort is normal.      Breath sounds: " Normal breath sounds.   Abdominal:      General: Bowel sounds are normal.      Palpations: Abdomen is soft.   Musculoskeletal:         General: Normal range of motion.      Cervical back: Normal range of motion and neck supple.   Skin:     General: Skin is warm and dry.   Neurological:      Mental Status: He is alert and oriented to person, place, and time.   Psychiatric:         Behavior: Behavior normal.         Thought Content: Thought content normal.         Judgment: Judgment normal.       Derm Physical Exam  Ortho Exam   Neurological Exam  Mental Status  Alert. Oriented to person, place, and time.    Cranial Nerves  CN III, IV, VI: Pupils equal round and reactive to light bilaterally.       Brief Urine Lab Results  (Last result in the past 365 days)        Color   Clarity   Blood   Leuk Est   Nitrite   Protein   CREAT   Urine HCG        11/08/24 1400 Yellow   Clear   Negative   Negative   Negative   Negative                   Assessment & Plan  Encounter for wellness examination  Discussed anticipatory guidance, diet, exercise, and weight loss.  Discussed safety and routine screening examinations.  Discussed self-examinations.  Beto Jo  reports that he has never smoked. He has never been exposed to tobacco smoke. He has never used smokeless tobacco.  Follow-up for routine health maintenance as indicated.   Orders:    POCT urinalysis dipstick, manual    CBC & Differential    Comprehensive Metabolic Panel    TSH    Lipid Panel With / Chol / HDL Ratio    Benign hypertension  Stable, continue current management     Orders:    atenolol (TENORMIN) 50 MG tablet; Take 1 tablet by mouth Daily.    lisinopril (PRINIVIL,ZESTRIL) 10 MG tablet; Take 1 tablet by mouth Daily.    CBC & Differential    Comprehensive Metabolic Panel    Mixed hyperlipidemia   Last labs reviewed, on zocor, recheck labs     Orders:    simvastatin (ZOCOR) 20 MG tablet; Take 1 tablet by mouth Daily.    Comprehensive Metabolic Panel    TSH     Lipid Panel With / Chol / HDL Ratio    History of elevated PSA  recheck  Orders:    PSA DIAGNOSTIC    Discussed anticipatory guidance, diet, exercise, and weight loss.  Discussed safety and routine screening examinations.  Discussed self-examinations.  Beto Jo  reports that he has never smoked. He has never been exposed to tobacco smoke. He has never used smokeless tobacco.   Follow-up for routine health maintenance as indicated.     BMI is within normal parameters. No other follow-up for BMI required.      Expected course, medications, and adverse effects discussed as appropriate.  Call or return if worsening or persistent symptoms.     This document is intended for medical professional use only.   Electronically signed by Champ Choudhary MD on 11/08/2024. This content may not have been proofread.

## 2024-11-08 NOTE — ASSESSMENT & PLAN NOTE
Last labs reviewed, on zocor, recheck labs     Orders:    simvastatin (ZOCOR) 20 MG tablet; Take 1 tablet by mouth Daily.    Comprehensive Metabolic Panel    TSH    Lipid Panel With / Chol / HDL Ratio

## 2024-11-10 LAB
ALBUMIN SERPL-MCNC: 4.3 G/DL (ref 3.9–4.9)
ALP SERPL-CCNC: 60 IU/L (ref 44–121)
ALT SERPL-CCNC: 17 IU/L (ref 0–44)
AST SERPL-CCNC: 24 IU/L (ref 0–40)
BASOPHILS # BLD AUTO: 0.1 X10E3/UL (ref 0–0.2)
BASOPHILS NFR BLD AUTO: 1 %
BILIRUB SERPL-MCNC: 0.5 MG/DL (ref 0–1.2)
BUN SERPL-MCNC: 17 MG/DL (ref 8–27)
BUN/CREAT SERPL: 14 (ref 10–24)
CALCIUM SERPL-MCNC: 9.1 MG/DL (ref 8.6–10.2)
CHLORIDE SERPL-SCNC: 103 MMOL/L (ref 96–106)
CHOLEST SERPL-MCNC: 166 MG/DL (ref 100–199)
CHOLEST/HDLC SERPL: 4.2 RATIO (ref 0–5)
CO2 SERPL-SCNC: 23 MMOL/L (ref 20–29)
CREAT SERPL-MCNC: 1.21 MG/DL (ref 0.76–1.27)
EGFRCR SERPLBLD CKD-EPI 2021: 67 ML/MIN/1.73
EOSINOPHIL # BLD AUTO: 0.2 X10E3/UL (ref 0–0.4)
EOSINOPHIL NFR BLD AUTO: 5 %
ERYTHROCYTE [DISTWIDTH] IN BLOOD BY AUTOMATED COUNT: 12.4 % (ref 11.6–15.4)
GLOBULIN SER CALC-MCNC: 2.7 G/DL (ref 1.5–4.5)
GLUCOSE SERPL-MCNC: 98 MG/DL (ref 70–99)
HCT VFR BLD AUTO: 46.5 % (ref 37.5–51)
HDLC SERPL-MCNC: 40 MG/DL
HGB BLD-MCNC: 15.8 G/DL (ref 13–17.7)
IMM GRANULOCYTES # BLD AUTO: 0 X10E3/UL (ref 0–0.1)
IMM GRANULOCYTES NFR BLD AUTO: 0 %
LDLC SERPL CALC-MCNC: 103 MG/DL (ref 0–99)
LYMPHOCYTES # BLD AUTO: 1.3 X10E3/UL (ref 0.7–3.1)
LYMPHOCYTES NFR BLD AUTO: 31 %
MCH RBC QN AUTO: 30.2 PG (ref 26.6–33)
MCHC RBC AUTO-ENTMCNC: 34 G/DL (ref 31.5–35.7)
MCV RBC AUTO: 89 FL (ref 79–97)
MONOCYTES # BLD AUTO: 0.5 X10E3/UL (ref 0.1–0.9)
MONOCYTES NFR BLD AUTO: 12 %
NEUTROPHILS # BLD AUTO: 2.2 X10E3/UL (ref 1.4–7)
NEUTROPHILS NFR BLD AUTO: 51 %
PLATELET # BLD AUTO: 233 X10E3/UL (ref 150–450)
POTASSIUM SERPL-SCNC: 5 MMOL/L (ref 3.5–5.2)
PROT SERPL-MCNC: 7 G/DL (ref 6–8.5)
PSA SERPL-MCNC: 4.8 NG/ML (ref 0–4)
RBC # BLD AUTO: 5.24 X10E6/UL (ref 4.14–5.8)
SODIUM SERPL-SCNC: 139 MMOL/L (ref 134–144)
TRIGL SERPL-MCNC: 127 MG/DL (ref 0–149)
TSH SERPL DL<=0.005 MIU/L-ACNC: 0.98 UIU/ML (ref 0.45–4.5)
VLDLC SERPL CALC-MCNC: 23 MG/DL (ref 5–40)
WBC # BLD AUTO: 4.2 X10E3/UL (ref 3.4–10.8)

## 2024-11-11 RX ORDER — SULFAMETHOXAZOLE AND TRIMETHOPRIM 800; 160 MG/1; MG/1
1 TABLET ORAL 2 TIMES DAILY
Qty: 28 TABLET | Refills: 0 | Status: SHIPPED | OUTPATIENT
Start: 2024-11-11